# Patient Record
Sex: FEMALE | Race: WHITE | Employment: PART TIME | URBAN - METROPOLITAN AREA
[De-identification: names, ages, dates, MRNs, and addresses within clinical notes are randomized per-mention and may not be internally consistent; named-entity substitution may affect disease eponyms.]

---

## 2017-08-25 ENCOUNTER — OFFICE VISIT (OUTPATIENT)
Dept: FAMILY MEDICINE CLINIC | Age: 27
End: 2017-08-25
Payer: COMMERCIAL

## 2017-08-25 VITALS
BODY MASS INDEX: 38.77 KG/M2 | HEART RATE: 88 BPM | HEIGHT: 67 IN | SYSTOLIC BLOOD PRESSURE: 126 MMHG | TEMPERATURE: 98.1 F | DIASTOLIC BLOOD PRESSURE: 86 MMHG | WEIGHT: 247 LBS

## 2017-08-25 DIAGNOSIS — R07.9 CHEST PAIN, UNSPECIFIED TYPE: Primary | ICD-10-CM

## 2017-08-25 DIAGNOSIS — R00.2 PALPITATIONS: ICD-10-CM

## 2017-08-25 PROCEDURE — 93000 ELECTROCARDIOGRAM COMPLETE: CPT | Performed by: FAMILY MEDICINE

## 2017-08-25 PROCEDURE — 99213 OFFICE O/P EST LOW 20 MIN: CPT | Performed by: FAMILY MEDICINE

## 2017-08-25 RX ORDER — DIPHENHYDRAMINE HCL 50 MG
50 CAPSULE ORAL NIGHTLY PRN
COMMUNITY
End: 2021-03-31 | Stop reason: ALTCHOICE

## 2017-08-25 ASSESSMENT — ENCOUNTER SYMPTOMS
SORE THROAT: 0
RHINORRHEA: 0
BACK PAIN: 0
COUGH: 0
NAUSEA: 0
SHORTNESS OF BREATH: 0
CHEST TIGHTNESS: 0
VOMITING: 0
ABDOMINAL PAIN: 0
EYES NEGATIVE: 1
DIARRHEA: 0

## 2017-08-26 LAB
ABSOLUTE BASO #: 0.1 K/UL (ref 0–0.1)
ABSOLUTE EOS #: 0.2 K/UL (ref 0.1–0.4)
ABSOLUTE LYMPH #: 2.4 K/UL (ref 0.8–5.2)
ABSOLUTE MONO #: 0.6 K/UL (ref 0.1–0.9)
ABSOLUTE NEUT #: 6.1 K/UL (ref 1.3–9.1)
ALBUMIN SERPL-MCNC: 4.1 G/DL (ref 3.2–5.3)
ALK PHOSPHATASE: 73 IU/L (ref 35–121)
ALT SERPL-CCNC: 23 IU/L (ref 5–59)
ANION GAP SERPL CALCULATED.3IONS-SCNC: 6 MMOL/L
AST SERPL-CCNC: 17 IU/L (ref 10–42)
BASOPHILS RELATIVE PERCENT: 0.5 %
BILIRUB SERPL-MCNC: 0.3 MG/DL (ref 0.2–1.3)
BUN BLDV-MCNC: 6 MG/DL (ref 10–20)
CALCIUM SERPL-MCNC: 9.4 MG/DL (ref 8.7–10.8)
CHLORIDE BLD-SCNC: 107 MMOL/L (ref 95–111)
CO2: 27 MMOL/L (ref 21–32)
CREAT SERPL-MCNC: 0.5 MG/DL (ref 0.5–1.3)
EGFR AFRICAN AMERICAN: 178
EGFR IF NONAFRICAN AMERICAN: 147
EOSINOPHILS RELATIVE PERCENT: 2.2 %
GLUCOSE: 101 MG/DL (ref 70–100)
HCT VFR BLD CALC: 41.3 % (ref 36–48)
HEMOGLOBIN: 13.9 G/DL (ref 12–16)
LYMPHOCYTE %: 25.6 %
MAGNESIUM: 1.9 MG/DL (ref 1.7–2.4)
MCH RBC QN AUTO: 28.6 PG (ref 27–34)
MCHC RBC AUTO-ENTMCNC: 33.7 G/DL (ref 31–36)
MCV RBC AUTO: 85 FL (ref 80–100)
MONOCYTES # BLD: 6.4 %
NEUTROPHILS RELATIVE PERCENT: 65 %
PDW BLD-RTO: 12 % (ref 10.8–14.8)
PLATELETS: 323 K/UL (ref 150–450)
POTASSIUM SERPL-SCNC: 4.2 MMOL/L (ref 3.5–5.4)
RBC: 4.86 M/UL (ref 4–5.5)
SODIUM BLD-SCNC: 136 MMOL/L (ref 134–147)
T4 FREE: 1.12 NG/DL (ref 0.8–1.8)
TOTAL PROTEIN: 6.7 G/DL (ref 5.8–8)
TSH SERPL DL<=0.05 MIU/L-ACNC: 0.86 UIU/ML (ref 0.4–4.4)
WBC: 9.3 K/UL (ref 3.7–10.8)

## 2017-08-29 ENCOUNTER — TELEPHONE (OUTPATIENT)
Dept: FAMILY MEDICINE CLINIC | Age: 27
End: 2017-08-29

## 2018-06-19 ENCOUNTER — HOSPITAL ENCOUNTER (EMERGENCY)
Age: 28
Discharge: HOME | End: 2018-06-19
Payer: COMMERCIAL

## 2018-06-19 VITALS
TEMPERATURE: 97.8 F | BODY MASS INDEX: 39.24 KG/M2 | SYSTOLIC BLOOD PRESSURE: 129 MMHG | WEIGHT: 250 LBS | HEART RATE: 83 BPM | RESPIRATION RATE: 16 BRPM | DIASTOLIC BLOOD PRESSURE: 64 MMHG | HEIGHT: 67 IN | OXYGEN SATURATION: 97 %

## 2018-06-19 DIAGNOSIS — N93.9 VAGINAL BLEEDING: ICD-10-CM

## 2018-06-19 DIAGNOSIS — O20.0 THREATENED MISCARRIAGE IN EARLY PREGNANCY: Primary | ICD-10-CM

## 2018-06-19 LAB
BASOPHILS # BLD: 0.4 %
BASOPHILS ABSOLUTE: 0 THOU/MM3 (ref 0–0.1)
EOSINOPHIL # BLD: 1 %
EOSINOPHILS ABSOLUTE: 0.1 THOU/MM3 (ref 0–0.4)
HCG,BETA SUBUNIT,QUAL,SERUM: ABNORMAL MIU/ML (ref 0–5)
HCT VFR BLD CALC: 39.4 % (ref 37–47)
HEMOGLOBIN: 13 GM/DL (ref 12–16)
LYMPHOCYTES # BLD: 21.3 %
LYMPHOCYTES ABSOLUTE: 2.3 THOU/MM3 (ref 1–4.8)
MCH RBC QN AUTO: 28.3 PG (ref 27–31)
MCHC RBC AUTO-ENTMCNC: 33 GM/DL (ref 33–37)
MCV RBC AUTO: 86 FL (ref 81–99)
MONOCYTES # BLD: 7.2 %
MONOCYTES ABSOLUTE: 0.8 THOU/MM3 (ref 0.4–1.3)
NUCLEATED RED BLOOD CELLS: 0 /100 WBC
PDW BLD-RTO: 12.9 % (ref 11.5–14.5)
PLATELET # BLD: 297 THOU/MM3 (ref 130–400)
PMV BLD AUTO: 10.9 FL (ref 7.4–10.4)
RBC # BLD: 4.6 MILL/MM3 (ref 4.2–5.4)
SEG NEUTROPHILS: 70.1 %
SEGMENTED NEUTROPHILS ABSOLUTE COUNT: 7.4 THOU/MM3 (ref 1.8–7.7)
WBC # BLD: 10.6 THOU/MM3 (ref 4.8–10.8)

## 2018-06-19 PROCEDURE — 99213 OFFICE O/P EST LOW 20 MIN: CPT | Performed by: NURSE PRACTITIONER

## 2018-06-19 PROCEDURE — 85025 COMPLETE CBC W/AUTO DIFF WBC: CPT

## 2018-06-19 PROCEDURE — 36415 COLL VENOUS BLD VENIPUNCTURE: CPT

## 2018-06-19 PROCEDURE — 99204 OFFICE O/P NEW MOD 45 MIN: CPT

## 2018-06-19 PROCEDURE — 6360000002 HC RX W HCPCS: Performed by: NURSE PRACTITIONER

## 2018-06-19 PROCEDURE — 84702 CHORIONIC GONADOTROPIN TEST: CPT

## 2018-06-19 RX ORDER — ONDANSETRON 4 MG/1
4 TABLET, ORALLY DISINTEGRATING ORAL EVERY 8 HOURS PRN
Qty: 20 TABLET | Refills: 0 | Status: SHIPPED | OUTPATIENT
Start: 2018-06-19 | End: 2021-03-31

## 2018-06-19 RX ORDER — ONDANSETRON 4 MG/1
4 TABLET, ORALLY DISINTEGRATING ORAL ONCE
Status: COMPLETED | OUTPATIENT
Start: 2018-06-19 | End: 2018-06-19

## 2018-06-19 RX ADMIN — ONDANSETRON 4 MG: 4 TABLET, ORALLY DISINTEGRATING ORAL at 08:31

## 2018-06-19 ASSESSMENT — ENCOUNTER SYMPTOMS
VOMITING: 0
DIARRHEA: 0
SHORTNESS OF BREATH: 0
ABDOMINAL PAIN: 1
NAUSEA: 1

## 2018-06-19 ASSESSMENT — PAIN DESCRIPTION - DESCRIPTORS: DESCRIPTORS: CRAMPING

## 2018-06-19 ASSESSMENT — PAIN DESCRIPTION - LOCATION: LOCATION: ABDOMEN

## 2018-06-19 ASSESSMENT — PAIN DESCRIPTION - PAIN TYPE: TYPE: ACUTE PAIN

## 2018-11-03 ENCOUNTER — HOSPITAL ENCOUNTER (OUTPATIENT)
Age: 28
Discharge: HOME | End: 2018-11-03
Payer: COMMERCIAL

## 2018-11-03 LAB
GLUCOSE FASTING: 92 MG/DL (ref 69–105)
GLUCOSE TOLERANCE TEST 1 HOUR: 163 MG/DL (ref 120–170)
GLUCOSE TOLERANCE TEST 2 HOUR: 124 MG/DL (ref 70–120)
GLUCOSE TOLERANCE TEST 3 HOUR: 100 MG/DL (ref 70–120)

## 2018-11-03 PROCEDURE — 36415 COLL VENOUS BLD VENIPUNCTURE: CPT

## 2018-11-03 PROCEDURE — 82951 GLUCOSE TOLERANCE TEST (GTT): CPT

## 2021-03-26 ENCOUNTER — HOSPITAL ENCOUNTER (OUTPATIENT)
Dept: ULTRASOUND IMAGING | Age: 31
Discharge: HOME | End: 2021-03-26
Payer: MEDICARE

## 2021-03-26 ENCOUNTER — HOSPITAL ENCOUNTER (OUTPATIENT)
Dept: NUCLEAR MEDICINE | Age: 31
Discharge: HOME | End: 2021-03-26

## 2021-03-26 DIAGNOSIS — Z00.6 EXAMINATION FOR NORMAL COMPARISON FOR CLINICAL RESEARCH: ICD-10-CM

## 2021-03-31 ENCOUNTER — OFFICE VISIT (OUTPATIENT)
Dept: SURGERY | Age: 31
End: 2021-03-31
Payer: MEDICARE

## 2021-03-31 VITALS
DIASTOLIC BLOOD PRESSURE: 62 MMHG | HEIGHT: 64 IN | BODY MASS INDEX: 46.78 KG/M2 | OXYGEN SATURATION: 98 % | SYSTOLIC BLOOD PRESSURE: 110 MMHG | RESPIRATION RATE: 18 BRPM | WEIGHT: 274 LBS | HEART RATE: 62 BPM | TEMPERATURE: 96.8 F

## 2021-03-31 DIAGNOSIS — Z01.818 PRE-OP TESTING: ICD-10-CM

## 2021-03-31 DIAGNOSIS — K82.8 BILIARY DYSKINESIA: ICD-10-CM

## 2021-03-31 DIAGNOSIS — K80.20 CALCULUS OF GALLBLADDER WITHOUT CHOLECYSTITIS WITHOUT OBSTRUCTION: Primary | ICD-10-CM

## 2021-03-31 DIAGNOSIS — E66.01 MORBID OBESITY (HCC): ICD-10-CM

## 2021-03-31 PROCEDURE — G8484 FLU IMMUNIZE NO ADMIN: HCPCS | Performed by: SURGERY

## 2021-03-31 PROCEDURE — 1036F TOBACCO NON-USER: CPT | Performed by: SURGERY

## 2021-03-31 PROCEDURE — 99204 OFFICE O/P NEW MOD 45 MIN: CPT | Performed by: SURGERY

## 2021-03-31 PROCEDURE — G8427 DOCREV CUR MEDS BY ELIG CLIN: HCPCS | Performed by: SURGERY

## 2021-03-31 PROCEDURE — G8417 CALC BMI ABV UP PARAM F/U: HCPCS | Performed by: SURGERY

## 2021-03-31 RX ORDER — PANTOPRAZOLE SODIUM 40 MG/1
TABLET, DELAYED RELEASE ORAL
COMMUNITY
Start: 2021-03-11 | End: 2021-11-29 | Stop reason: ALTCHOICE

## 2021-03-31 RX ORDER — FAMOTIDINE 40 MG/1
TABLET, FILM COATED ORAL
COMMUNITY
Start: 2021-03-04 | End: 2021-11-29 | Stop reason: ALTCHOICE

## 2021-03-31 NOTE — LETTER
[address] [facility]  [facility]. [address]  [facility] [address]  Phone: [phone]  Fax: [phone]    Mulu Falk, DIANNA*    April 1, 2021     Patient: Emmie Infante   MR Number: [id number]   YOB: 1990   Date of Visit: 3/31/2021     Dear Hermelindo Askew:    Thank you for the request for consultation on Vivi Richards. Below are the relevant portions of my assessment and plan of care. ASSESSMENT:  1. Cholelithiasis  2. Biliary dyskinesia  3. Morbid obesity (BMI 47)  4. ESPINAL    PLAN:  1. Schedule Hellen for robotic possible open cholecystectomy. 2. She will undergo pre-operative clearance per anesthesia guidelines with risk factors listed under the past medical history diagnosis & problem list.  3. The risks, benefits and alternatives were discussed with [facility] including non-operative management. The pros and cons of robotic, laparoscopic and open techniques were discussed. All questions answered. She understands and wishes to proceed with surgical intervention. 4. Restrictions discussed with [facility] and she expresses understanding. 5. She is advised to call back directly if there are further questions/concerns, or if her symptoms worsen prior to surgery. 6.  Dietary modification/restrictions discussed with patient regards to gallbladder disease. Also nutritional education occurred in regards to obesity. Encouraged weight loss to the best of the patient's ability. All questions answered. 7.  Following up with GI as directed. If you have questions, please do not hesitate to call me. I look forward to following [facility] along with you.     Sincerely,    Lori Mckeon MD

## 2021-04-01 ASSESSMENT — ENCOUNTER SYMPTOMS
SINUS PRESSURE: 0
CHEST TIGHTNESS: 0
COUGH: 0
VOICE CHANGE: 0
TROUBLE SWALLOWING: 0
ALLERGIC/IMMUNOLOGIC NEGATIVE: 1
NAUSEA: 1
BACK PAIN: 0
RECTAL PAIN: 0
CHOKING: 0
ABDOMINAL PAIN: 1
APNEA: 0
EYE DISCHARGE: 0
RHINORRHEA: 0
PHOTOPHOBIA: 0
COLOR CHANGE: 0
VOMITING: 1
ANAL BLEEDING: 0
CONSTIPATION: 0
EYE REDNESS: 0
FACIAL SWELLING: 0
DIARRHEA: 0
ABDOMINAL DISTENTION: 0
WHEEZING: 0
EYE PAIN: 0
STRIDOR: 0
EYE ITCHING: 0
SHORTNESS OF BREATH: 0
SORE THROAT: 0
BLOOD IN STOOL: 0

## 2021-04-01 NOTE — PROGRESS NOTES
Natoedwina Rondon (:  1990)     ASSESSMENT:  1. Cholelithiasis  2. Biliary dyskinesia  3. Morbid obesity (BMI 47)  4. ESPINAL    PLAN:  1. Schedule Hellen for robotic possible open cholecystectomy. 2. She will undergo pre-operative clearance per anesthesia guidelines with risk factors listed under the past medical history diagnosis & problem list.  3. The risks, benefits and alternatives were discussed with Mae Carlin including non-operative management. The pros and cons of robotic, laparoscopic and open techniques were discussed. All questions answered. She understands and wishes to proceed with surgical intervention. 4. Restrictions discussed with Mae Carlin and she expresses understanding. 5. She is advised to call back directly if there are further questions/concerns, or if her symptoms worsen prior to surgery. 6.  Dietary modification/restrictions discussed with patient regards to gallbladder disease. Also nutritional education occurred in regards to obesity. Encouraged weight loss to the best of the patient's ability. All questions answered. 7.  Following up with GI as directed. SUBJECTIVE/OBJECTIVE:    Chief Complaint   Patient presents with    Surgical Consult     New patient-referred by SUELLEN [facility] on HIDA      HPI  Mae Carlin is a 49-year-old female presents for gallbladder disease evaluation. Patient admits symptoms have been going on now for about a year. Gradually becoming more severe and frequent. Usually early in the morning. Moderate to severe epigastric abdominal pain. May last 10-15 minutes but extend up to 2-3 hours. Sharp with some radiation to the right upper quadrant/flank. Associated with nausea. Occasional emesis. No change with bowel function. No hematochezia or melena. No new urinary complaints. Previous ultrasound demonstrated multiple gallstones but no acute disease. Previous HIDA scan had ejection fraction of 34%.   Positive reproduction of Psychiatric/Behavioral: Negative for agitation, behavioral problems, confusion, decreased concentration, dysphoric mood, hallucinations, self-injury, sleep disturbance and suicidal ideas. The patient is not nervous/anxious and is not hyperactive. Past Medical History:   Diagnosis Date    GERD (gastroesophageal reflux disease)     Seasonal allergies        Past Surgical History:   Procedure Laterality Date     SECTION  10/15/2010     SECTION  2019    TUBAL LIGATION  2019    UPPER GASTROINTESTINAL ENDOSCOPY  2020    Nate       Current Outpatient Medications   Medication Sig Dispense Refill    pantoprazole (PROTONIX) 40 MG tablet TAKE 1 TABLET BY MOUTH IN THE MORNING      famotidine (PEPCID) 40 MG tablet TAKE 1 TABLET BY MOUTH EVERY DAY AT BEDTIME AS NEEDED       No current facility-administered medications for this visit.         No Known Allergies    Family History   Problem Relation Age of Onset    Heart Disease Father     Stroke Father     High Blood Pressure Father     High Cholesterol Father     Kidney Disease Father     Other Father         blood disorder MTHFR    No Known Problems Mother     No Known Problems Brother     No Known Problems Maternal Grandmother     No Known Problems Maternal Grandfather     No Known Problems Paternal Grandmother     No Known Problems Brother        Social History     Socioeconomic History    Marital status: Single     Spouse name: Not on file    Number of children: Not on file    Years of education: Not on file    Highest education level: Not on file   Occupational History    Not on file   Social Needs    Financial resource strain: Not on file    Food insecurity     Worry: Not on file     Inability: Not on file    Transportation needs     Medical: Not on file     Non-medical: Not on file   Tobacco Use    Smoking status: Former Smoker     Packs/day: 0.25     Types: Cigarettes     Quit date: 2011 Years since quittin.5    Smokeless tobacco: Never Used    Tobacco comment: 1 pack every 2 days for 3 yrs   Substance and Sexual Activity    Alcohol use: No    Drug use: No    Sexual activity: Not on file   Lifestyle    Physical activity     Days per week: Not on file     Minutes per session: Not on file    Stress: Not on file   Relationships    Social connections     Talks on phone: Not on file     Gets together: Not on file     Attends Taoist service: Not on file     Active member of club or organization: Not on file     Attends meetings of clubs or organizations: Not on file     Relationship status: Not on file    Intimate partner violence     Fear of current or ex partner: Not on file     Emotionally abused: Not on file     Physically abused: Not on file     Forced sexual activity: Not on file   Other Topics Concern    Not on file   Social History Narrative    Not on file     Vitals:    21 0937   BP: 110/62   Site: Right Upper Arm   Position: Sitting   Cuff Size: Medium Adult   Pulse: 62   Resp: 18   Temp: 96.8 °F (36 °C)   TempSrc: Temporal   SpO2: 98%   Weight: 274 lb (124.3 kg)   Height: 5' 4\" (1.626 m)     Body mass index is 47.03 kg/m². Wt Readings from Last 3 Encounters:   21 274 lb (124.3 kg)   18 250 lb (113.4 kg)   17 247 lb (112 kg)     Physical Exam  Vitals signs reviewed. Constitutional:       General: She is not in acute distress. Appearance: She is well-developed. She is not diaphoretic. HENT:      Head: Normocephalic and atraumatic. Right Ear: External ear normal.      Left Ear: External ear normal.      Nose: Nose normal.   Eyes:      General: No scleral icterus. Right eye: No discharge. Left eye: No discharge. Conjunctiva/sclera: Conjunctivae normal.   Neck:      Musculoskeletal: Normal range of motion and neck supple. Cardiovascular:      Rate and Rhythm: Normal rate and regular rhythm.       Heart sounds: Normal heart sounds. Pulmonary:      Effort: Pulmonary effort is normal. No respiratory distress. Breath sounds: Normal breath sounds. No wheezing or rales. Chest:      Chest wall: No tenderness. Abdominal:      General: Bowel sounds are normal. There is no distension. Palpations: Abdomen is soft. There is no mass. Tenderness: There is no abdominal tenderness. There is no guarding or rebound. Musculoskeletal: Normal range of motion. General: No tenderness. Skin:     General: Skin is warm and dry. Coloration: Skin is not pale. Findings: No erythema or rash. Neurological:      Mental Status: She is alert and oriented to person, place, and time. Cranial Nerves: No cranial nerve deficit. Psychiatric:         Behavior: Behavior normal.         Thought Content: Thought content normal.         Judgment: Judgment normal.       Lab Results   Component Value Date     2017    K 4.2 2017     2017    CO2 27 2017     Lab Results   Component Value Date    CREATININE 0.5 2017     Lab Results   Component Value Date    WBC 8.0 08/10/2020    HGB 13.3 08/10/2020    HCT 40.2 08/10/2020    MCV 84.5 08/10/2020     08/10/2020     Lab Results   Component Value Date    ALT 27 08/10/2020    AST 15 08/10/2020    ALKPHOS 71 08/10/2020    BILITOT 0.4 08/10/2020     No results found for: LIPASE    Imaging -   Narrative   Ordering Provider: Pako HYDE   \[facility]\"" [address]          Radiology Department  Patient: [address] [facility] [address]  :  1990   Sex:  F     [address] [facility], [address] [place] [address]     [phone]   Unit #:   [id number]       [facility]       Ordering Phys: Pako Lyons MD, JACKIE            Exam Date: 08/10/20     Accession #:  [id number]     Exam:  US   US Portal Vein     Result: See Report            STUDY:  HEPATIC DOPPLER ULTRASOUND          REASON FOR EXAM:   Female, 30years old. Right Upper Quadrant     Result: See Report            STUDY:  ABDOMINAL ULTRASOUND - RIGHT UPPER QUADRANT          REASON FOR VISIT:   Female, 30years old [address], sharp pain,     nausea and vomiting          TECHNIQUE:   Ultrasound evaluation of the right upper quadrant was     performed with real-time and static gray-scale imaging.          TECHNICAL  QUALITY:   Adequate.          COMPARISON:   None.     ___________________________________          FINDINGS:          Liver:  The liver measures 18 cm.  There is increased echogenicity of the     liver.  The bile ducts are within normal limits.  There is hepatic color     flow.  The direction of portal flow is hepatopetal.  There is no     demonstrated mass lesion.          Gallbladder:  Normal distended gallbladder.  The gallbladder wall measures     2.3 mm.  There is a negative sonographic Pagan''s sign.  There is no     pericholecystic fluid.  There are multiple echogenic structures within the     gallbladder, consistent with multiple gallstones.          Common Bile Duct (C.B.D.):   The common bile duct measures 3.4 mm.          Pancreas:     There is normal echogenicity of the visualized pancreas.     There is no demonstrated pancreatic mass or cyst.          Right Kidney:  Normal size of the right kidney.  The right kidney measures     5.6 x 6.0 x 12.5 cm.  Normal renal cortex.  The right cortex measures 1.0     cm.  There is no demonstrated renal mass or cyst.  There is no right     hydronephrosis.     ___________________________________          IMPRESSION:     1.  Cholelithiasis without sonographic evidence of acute cholecystitis.     2.  Hepatomegaly.  Increased echogenicity of the liver is nonspecific but     most commonly associated with hepatic steatosis.           Electronically Signed:     Jose Dubon MD     2020/08/10 at 10:55 EDT     Tel [phone], Service support  [phone], Fax [phone]                         cc: Johnny Rodriguez ([facility]) CNP; Pako Lyons MD CNSP               Dictated by: Jonna Arreguin. Duran Dubon MD on 08/10/20 1055     Technologist:   RT(R) BUDDY RVT Dori Campbell     Transcribed by: Ozzie Ricardo on 08/10/20 1055          Report Signed by: SHERWIN Tucker MD on 08/10/20 1055             Patient Active Problem List   Diagnosis    Seasonal allergies     An electronic signature was used to authenticate this note.     --Graciela French MD

## 2021-04-30 ENCOUNTER — HOSPITAL ENCOUNTER (EMERGENCY)
Age: 31
Discharge: HOME | End: 2021-04-30
Payer: MEDICARE

## 2021-04-30 VITALS
HEIGHT: 69 IN | BODY MASS INDEX: 39.99 KG/M2 | RESPIRATION RATE: 19 BRPM | SYSTOLIC BLOOD PRESSURE: 150 MMHG | DIASTOLIC BLOOD PRESSURE: 84 MMHG | HEART RATE: 84 BPM | OXYGEN SATURATION: 98 % | TEMPERATURE: 98 F | WEIGHT: 270 LBS

## 2021-04-30 DIAGNOSIS — H18.892 CORNEAL IRRITATION OF LEFT EYE: Primary | ICD-10-CM

## 2021-04-30 PROCEDURE — 99213 OFFICE O/P EST LOW 20 MIN: CPT | Performed by: NURSE PRACTITIONER

## 2021-04-30 PROCEDURE — 99213 OFFICE O/P EST LOW 20 MIN: CPT

## 2021-04-30 RX ORDER — SOFT LENS RINSE,STORE SOLUTION
SOLUTION, NON-ORAL MISCELLANEOUS
Status: DISCONTINUED
Start: 2021-04-30 | End: 2021-04-30 | Stop reason: HOSPADM

## 2021-04-30 RX ORDER — MOXIFLOXACIN 5 MG/ML
1 SOLUTION/ DROPS OPHTHALMIC 3 TIMES DAILY
Qty: 1 BOTTLE | Refills: 0 | Status: SHIPPED | OUTPATIENT
Start: 2021-04-30 | End: 2021-05-07

## 2021-04-30 RX ORDER — PROPARACAINE HYDROCHLORIDE 5 MG/ML
2 SOLUTION/ DROPS OPHTHALMIC ONCE
Status: DISCONTINUED | OUTPATIENT
Start: 2021-04-30 | End: 2021-04-30 | Stop reason: HOSPADM

## 2021-04-30 RX ORDER — PROPARACAINE HYDROCHLORIDE 5 MG/ML
SOLUTION/ DROPS OPHTHALMIC
Status: DISCONTINUED
Start: 2021-04-30 | End: 2021-04-30 | Stop reason: HOSPADM

## 2021-04-30 ASSESSMENT — ENCOUNTER SYMPTOMS: EYE PAIN: 1

## 2021-04-30 NOTE — ED NOTES
Patient discharge instructions given to pt and pt verbalized understanding, no other needs at this time, and pt left in stable condition.      Braden Lee RN  04/30/21 [id number]

## 2021-04-30 NOTE — ED TRIAGE NOTES
Pt presents to  with c/o left eye pain that has been ongoing for two weeks. Pt reports she tried a new mascara and believes a fiber got in her eye. Pt denies changes in vision - reports she sees fine just has pain. Pt has some redness to left eye upon assessment.

## 2021-04-30 NOTE — ED PROVIDER NOTES
BEDTIME AS NEEDEDHistorical Med             ALLERGIES     Patient is has No Known Allergies. Patients   Immunization History   Administered Date(s) Administered    DTaP 05/16/1989, 05/02/1990, 06/03/1990, 07/17/1991, 08/18/1993    Hepatitis B 09/30/2005, 11/04/2005, 05/19/2006    Hib, unspecified 07/17/1991    Influenza Virus Vaccine 09/19/2013    MMR 01/03/1990, 02/22/2001    PPD Test 07/01/2013, 07/10/2013, 06/24/2014    Polio IPV (IPOL) 05/16/1989, 06/03/1990, 07/17/1991, 08/18/1993    Tdap (Boostrix, Adacel) 07/01/2013       FAMILY HISTORY     Patient's family history includes Heart Disease in her father; High Blood Pressure in her father; High Cholesterol in her father; Kidney Disease in her father; No Known Problems in her brother, brother, maternal grandfather, maternal grandmother, mother, and paternal grandmother; Other in her father; Stroke in her father. SOCIAL HISTORY     Patient  reports that she quit smoking about 9 years ago. Her smoking use included cigarettes. She smoked 0.25 packs per day. She has never used smokeless tobacco. She reports that she does not drink alcohol or use drugs. PHYSICAL EXAM     ED TRIAGE VITALS  BP: (!) 150/84, Temp: 98 °F (36.7 °C), Pulse: 84, Resp: 19, SpO2: 98 %,Estimated body mass index is 39.87 kg/m² as calculated from the following:    Height as of this encounter: 5' 9\" (1.753 m). Weight as of this encounter: 270 lb (122.5 kg). ,No LMP recorded. Physical Exam  Vitals signs and nursing note reviewed. Constitutional:       General: She is not in acute distress. Appearance: Normal appearance. She is well-developed. She is not ill-appearing, toxic-appearing or diaphoretic. HENT:      Head: Normocephalic. Right Ear: External ear normal.      Left Ear: External ear normal.      Nose: Nose normal.   Eyes:      General: No allergic shiner or visual field deficit. Right eye: No discharge. Left eye: No foreign body or discharge. Extraocular Movements: Extraocular movements intact. Conjunctiva/sclera:      Left eye: Left conjunctiva is not injected. Pupils: Pupils are equal, round, and reactive to light. Comments: Patient did get relief of discomfort with the Alcaine drops to the left eye. Fluorescein stain was applied the patient did appear to have more like a fiber type of straining noted floating over the eye. The patient stated to her that is what it felt like prior to Alcaine drops. There was no foreign bodies embedded into the eye. Neck:      Musculoskeletal: Full passive range of motion without pain, normal range of motion and neck supple. Cardiovascular:      Rate and Rhythm: Normal rate. Pulmonary:      Effort: Pulmonary effort is normal.   Skin:     General: Skin is warm and dry. Capillary Refill: Capillary refill takes less than 2 seconds. Neurological:      Mental Status: She is alert and oriented to person, place, and time. Psychiatric:         Behavior: Behavior is cooperative. DIAGNOSTIC RESULTS     Labs:No results found for this visit on 04/30/21. IMAGING:    No orders to display         EKG:      URGENT CARE COURSE:     Vitals:    04/30/21 1101 04/30/21 1103   BP:  (!) 150/84   Pulse: 84    Resp: 19    Temp: 98 °F (36.7 °C)    SpO2: 98%    Weight: 270 lb (122.5 kg)    Height: 5' 9\" (1.753 m)        Medications   fluorescein ophthalmic strip 1 mg (has no administration in time range)   proparacaine (ALCAINE) 0.5 % ophthalmic solution 2 drop (has no administration in time range)   proparacaine (ALCAINE) 0.5 % ophthalmic solution (has no administration in time range)   eye wash ophthalmic solution (has no administration in time range)            PROCEDURES:  None    FINAL IMPRESSION      1.  Corneal irritation of left eye          DISPOSITION/ PLAN     Use medication as directed  Don't rub the eye  Do not wear contact lens ×1 week  Monitor for any changes such as drainage, pain,

## 2021-05-07 ENCOUNTER — HOSPITAL ENCOUNTER (OUTPATIENT)
Age: 31
Discharge: HOME | End: 2021-05-07
Payer: MEDICARE

## 2021-05-07 DIAGNOSIS — K80.20 CALCULUS OF GALLBLADDER WITHOUT CHOLECYSTITIS WITHOUT OBSTRUCTION: ICD-10-CM

## 2021-05-07 DIAGNOSIS — Z01.818 PRE-OP TESTING: ICD-10-CM

## 2021-05-07 DIAGNOSIS — K82.8 BILIARY DYSKINESIA: ICD-10-CM

## 2021-05-07 LAB
ALBUMIN SERPL-MCNC: 4.2 G/DL (ref 3.5–5.1)
ALP BLD-CCNC: 90 U/L (ref 38–126)
ALT SERPL-CCNC: 63 U/L (ref 11–66)
AST SERPL-CCNC: 31 U/L (ref 5–40)
BILIRUB SERPL-MCNC: 0.2 MG/DL (ref 0.3–1.2)
BILIRUBIN DIRECT: < 0.2 MG/DL (ref 0–0.3)
TOTAL PROTEIN: 7.3 G/DL (ref 6.1–8)

## 2021-05-07 PROCEDURE — U0005 INFEC AGEN DETEC AMPLI PROBE: HCPCS

## 2021-05-07 PROCEDURE — U0003 INFECTIOUS AGENT DETECTION BY NUCLEIC ACID (DNA OR RNA); SEVERE ACUTE RESPIRATORY SYNDROME CORONAVIRUS 2 (SARS-COV-2) (CORONAVIRUS DISEASE [COVID-19]), AMPLIFIED PROBE TECHNIQUE, MAKING USE OF HIGH THROUGHPUT TECHNOLOGIES AS DESCRIBED BY CMS-2020-01-R: HCPCS

## 2021-05-07 PROCEDURE — 80076 HEPATIC FUNCTION PANEL: CPT

## 2021-05-07 PROCEDURE — 36415 COLL VENOUS BLD VENIPUNCTURE: CPT

## 2021-05-09 LAB
SARS-COV-2: NOT DETECTED
SOURCE: NORMAL

## 2021-05-09 NOTE — H&P
Ceasar Solano (: 1990)   ASSESSMENT:   1. Cholelithiasis   2. Biliary dyskinesia   3. Morbid obesity (BMI 47)   4. ESPINAL   PLAN:   1. Schedule Hellen for robotic possible open cholecystectomy. 2. She will undergo pre-operative clearance per anesthesia guidelines with risk factors listed under the past medical history diagnosis & problem list.   3. The risks, benefits and alternatives were discussed with Atif Gunderson including non-operative management. The pros and cons of robotic, laparoscopic and open techniques were discussed. All questions answered. She understands and wishes to proceed with surgical intervention. 4. Restrictions discussed with Atif Gunderson and she expresses understanding. 5. She is advised to call back directly if there are further questions/concerns, or if her symptoms worsen prior to surgery. 6. Dietary modification/restrictions discussed with patient regards to gallbladder disease. Also nutritional education occurred in regards to obesity. Encouraged weight loss to the best of the patient's ability. All questions answered. 7. Following up with GI as directed. SUBJECTIVE/OBJECTIVE:        Chief Complaint   Patient presents with    Surgical Consult     New patient-referred by SUELLEN Do [facility]-Low EF on HIDA      HPI   Atif Gunderson is a 64-year-old female presents for gallbladder disease evaluation. Patient admits symptoms have been going on now for about a year. Gradually becoming more severe and frequent. Usually early in the morning. Moderate to severe epigastric abdominal pain. May last 10-15 minutes but extend up to 2-3 hours. Sharp with some radiation to the right upper quadrant/flank. Associated with nausea. Occasional emesis. No change with bowel function. No hematochezia or melena. No new urinary complaints. Previous ultrasound demonstrated multiple gallstones but no acute disease. Previous HIDA scan had ejection fraction of 34%. Positive reproduction of symptoms.  History of recent upper endoscopy demonstrating no acute findings. Patient with known fatty liver disease. Denies current chest pain or shortness of breath. No lightheadedness or dizziness. No fever, chills or sweats. Feels symptoms are becoming more severe and frequent and wishing to proceed with cholecystectomy if possible. Review of Systems   Constitutional: Negative for activity change, appetite change, chills, diaphoresis, fatigue, fever and unexpected weight change. HENT: Negative for congestion, dental problem, drooling, ear discharge, ear pain, facial swelling, hearing loss, mouth sores, nosebleeds, postnasal drip, rhinorrhea, sinus pressure, sneezing, sore throat, tinnitus, trouble swallowing and voice change. Eyes: Negative for photophobia, pain, discharge, redness, itching and visual disturbance. Respiratory: Negative for apnea, cough, choking, chest tightness, shortness of breath, wheezing and stridor. Cardiovascular: Negative for chest pain, palpitations and leg swelling. Gastrointestinal: Positive for abdominal pain, nausea and vomiting. Negative for abdominal distention, anal bleeding, blood in stool, constipation, diarrhea and rectal pain. Endocrine: Negative. Genitourinary: Negative for decreased urine volume, difficulty urinating, dyspareunia, dysuria, enuresis, flank pain, frequency, genital sores, hematuria, menstrual problem, pelvic pain, urgency, vaginal bleeding, vaginal discharge and vaginal pain. Musculoskeletal: Negative for arthralgias, back pain, gait problem, joint swelling, myalgias, neck pain and neck stiffness. Skin: Negative for color change, pallor, rash and wound. Allergic/Immunologic: Negative. Neurological: Negative for dizziness, tremors, seizures, syncope, facial asymmetry, speech difficulty, weakness, light-headedness, numbness and headaches. Hematological: Negative for adenopathy. Does not bruise/bleed easily.    Psychiatric/Behavioral: Negative for agitation, behavioral problems, confusion, decreased concentration, dysphoric mood, hallucinations, self-injury, sleep disturbance and suicidal ideas. The patient is not nervous/anxious and is not hyperactive. Past Medical History        Past Medical History:   Diagnosis Date    GERD (gastroesophageal reflux disease)     Seasonal allergies      Past Surgical History         Past Surgical History:   Procedure Laterality Date     SECTION  10/15/2010     SECTION  2019    TUBAL LIGATION  2019    UPPER GASTROINTESTINAL ENDOSCOPY  2020    Nate     Current Facility-Administered Medications          Current Outpatient Medications   Medication Sig Dispense Refill    pantoprazole (PROTONIX) 40 MG tablet TAKE 1 TABLET BY MOUTH IN THE MORNING      famotidine (PEPCID) 40 MG tablet TAKE 1 TABLET BY MOUTH EVERY DAY AT BEDTIME AS NEEDED     No current facility-administered medications for this visit.      No Known Allergies   Family History         Family History   Problem Relation Age of Onset    Heart Disease Father     Stroke Father     High Blood Pressure Father     High Cholesterol Father     Kidney Disease Father     Other Father     blood disorder MTHFR    No Known Problems Mother     No Known Problems Brother     No Known Problems Maternal Grandmother     No Known Problems Maternal Grandfather     No Known Problems Paternal Grandmother     No Known Problems Brother      Social History         Socioeconomic History    Marital status: Single     Spouse name: Not on file    Number of children: Not on file    Years of education: Not on file    Highest education level: Not on file   Occupational History    Not on file   Social Needs    Financial resource strain: Not on file    Food insecurity     Worry: Not on file     Inability: Not on file    Transportation needs     Medical: Not on file     Non-medical: Not on file   Tobacco Use    Smoking status: Former Smoker Packs/day: 0.25     Types: Cigarettes     Quit date: 2011     Years since quittin.5    Smokeless tobacco: Never Used    Tobacco comment: 1 pack every 2 days for 3 yrs   Substance and Sexual Activity    Alcohol use: No    Drug use: No    Sexual activity: Not on file   Lifestyle    Physical activity     Days per week: Not on file     Minutes per session: Not on file    Stress: Not on file   Relationships    Social connections     Talks on phone: Not on file     Gets together: Not on file     Attends Sabianist service: Not on file     Active member of club or organization: Not on file     Attends meetings of clubs or organizations: Not on file     Relationship status: Not on file    Intimate partner violence     Fear of current or ex partner: Not on file     Emotionally abused: Not on file     Physically abused: Not on file     Forced sexual activity: Not on file   Other Topics Concern    Not on file   Social History Narrative    Not on file     Vitals       Vitals:    21 0937   BP: 110/62   Site: Right Upper Arm   Position: Sitting   Cuff Size: Medium Adult   Pulse: 62   Resp: 18   Temp: 96.8 °F (36 °C)   TempSrc: Temporal   SpO2: 98%   Weight: 274 lb (124.3 kg)   Height: 5' 4\" (1.626 m)   Body mass index is 47.03 kg/m². Wt Readings from Last 3 Encounters:   21 274 lb (124.3 kg)   18 250 lb (113.4 kg)   17 247 lb (112 kg)     Physical Exam   Vitals signs reviewed. Constitutional:   General: She is not in acute distress. Appearance: She is well-developed. She is not diaphoretic. HENT:   Head: Normocephalic and atraumatic. Right Ear: External ear normal.   Left Ear: External ear normal.   Nose: Nose normal.   Eyes:   General: No scleral icterus. Right eye: No discharge. Left eye: No discharge. Conjunctiva/sclera: Conjunctivae normal.   Neck:   Musculoskeletal: Normal range of motion and neck supple.    Cardiovascular:   Rate and Rhythm: Normal rate and regular rhythm. Heart sounds: Normal heart sounds. Pulmonary:   Effort: Pulmonary effort is normal. No respiratory distress. Breath sounds: Normal breath sounds. No wheezing or rales. Chest:   Chest wall: No tenderness. Abdominal:   General: Bowel sounds are normal. There is no distension. Palpations: Abdomen is soft. There is no mass. Tenderness: There is no abdominal tenderness. There is no guarding or rebound. Musculoskeletal: Normal range of motion. General: No tenderness. Skin:   General: Skin is warm and dry. Coloration: Skin is not pale. Findings: No erythema or rash. Neurological:   Mental Status: She is alert and oriented to person, place, and time. Cranial Nerves: No cranial nerve deficit. Psychiatric:   Behavior: Behavior normal.   Thought Content: Thought content normal.   Judgment: Judgment normal.           Lab Results   Component Value Date     2017    K 4.2 2017     2017    CO2 27 2017           Lab Results   Component Value Date    CREATININE 0.5 2017           Lab Results   Component Value Date    WBC 8.0 08/10/2020    HGB 13.3 08/10/2020    HCT 40.2 08/10/2020    MCV 84.5 08/10/2020     08/10/2020           Lab Results   Component Value Date    ALT 27 08/10/2020    AST 15 08/10/2020    ALKPHOS 71 08/10/2020    BILITOT 0.4 08/10/2020     No results found for: LIPASE   Imaging -   Narrative   Ordering Provider: [address] [place] [address]       Radiology Department Patient: Arlen Hamm [facility]. : 1990 Sex: 46[address] Location: [place] [id number] Unit #: [id number]    Acct #: [de-identified]    Ordering Phys: Javed Mak MD, CNSP       Exam Date: 08/10/20    Accession #: [id number]    Exam: US US Portal Vein    Result: See Report       STUDY: HEPATIC DOPPLER ULTRASOUND       REASON FOR EXAM: Female, 30years old.  EPIGASTRIC (sharp) PAIN, nausea    and vomiting TECHNIQUE: Technique: Grayscale, color flow and spectral analysis of the    hepatic vessels (portal vein, hepatic veins and hepatic artery) performed. COMPARISON: None    ___________________________________    FINDINGS:       The portal vein is patent with normal direction of flow (hepatopedal). No    evidence of thrombus, collateralization or varices. ? The portal vein    velocity measures? 27 cm/s. The portal vein measures? 1. 0? cm in quiet    respiration, with the patient supine. The hepatic artery shows normal spectral waveform analysis with normal    direction of flow. The peak systolic velocity measures? 68.7 cm/s. The right, middle and left hepatic veins are patent with hepatofugal flow    (normal). There is typical respiratory and atrial variation of the    spectral waveform. ___________________________________       IMPRESSION:       Normal hepatic Doppler ultrasound. No evidence of portal hypertension or    thrombosis. Electronically Signed:    Tracee Field MD    2020/08/10 at 10:54 EDT    Tel [phone], Service support [phone], Fax [phone]                cc: Ruddy Richey ([facility]) CNP; Edu Storm MD CNS          Dictated by: Ghazal Field MD on 08/10/20 1054    Technologist: RT(R) ARDMS T Dori Scott    Transcribed by: Hal Crigler on 08/10/20 1054       Report Signed by: SHERWIN Orr MD on 08/10/20 1054      Narrative   Ordering Provider: [address] [place] [address]       Radiology Department Patient: Ash Navas    [place]. : 1990 Sex:  [place], [address] [facility] [place], [place] Location: [id number] Unit #: [id number]    Acct #: [de-identified]    Ordering Phys: Edu Storm MD, CNSP       Exam Date: 08/10/20    Accession #: [id number]    Exam: US US Abd Right Upper Quadrant    Result: See Report       STUDY: ABDOMINAL ULTRASOUND - RIGHT UPPER QUADRANT       REASON FOR VISIT: Female, 30years old EPIGASTRIC PAIN, sharp pain,    nausea and vomiting       TECHNIQUE: Ultrasound evaluation of the right upper quadrant was    performed with real-time and static gray-scale imaging. TECHNICAL QUALITY: Adequate. COMPARISON: None.    ___________________________________       FINDINGS:       Liver: The liver measures 18 cm. There is increased echogenicity of the    liver. The bile ducts are within normal limits. There is hepatic color    flow. The direction of portal flow is hepatopetal. There is no    demonstrated mass lesion. Gallbladder: Normal distended gallbladder. The gallbladder wall measures    2.3 mm. There is a negative sonographic Pagan''s sign. There is no    pericholecystic fluid. There are multiple echogenic structures within the    gallbladder, consistent with multiple gallstones. Common Bile Duct (C.B.D.): The common bile duct measures 3.4 mm. Pancreas: There is normal echogenicity of the visualized pancreas. There is no demonstrated pancreatic mass or cyst.       Right Kidney: Normal size of the right kidney. The right kidney measures    5.6 x 6.0 x 12.5 cm. Normal renal cortex. The right cortex measures 1.0    cm. There is no demonstrated renal mass or cyst. There is no right    hydronephrosis. ___________________________________       IMPRESSION:    1. Cholelithiasis without sonographic evidence of acute cholecystitis. 2. Hepatomegaly. Increased echogenicity of the liver is nonspecific but    most commonly associated with hepatic steatosis. Electronically Signed:    Nya Garcia MD    2020/08/10 at 10:55 EDT    Tel [phone], Service support [phone], Fax [phone]                cc: Princess Carter ([facility]) CNP; Mildred Oakes MD CNSP          Dictated by: Connie Garcia MD on 08/10/20 1055    Technologist: RT BUDDY Villalpando (R)    Transcribed by: Landrum Brigido on 08/10/20 1055       Report Signed by: Sun Garcia [facility] on 08/10/20 1055 Patient Active Problem List   Diagnosis    Seasonal allergies     An electronic signature was used to authenticate this note.    --Paloma Washington MD

## 2021-05-10 ENCOUNTER — PREP FOR PROCEDURE (OUTPATIENT)
Dept: SURGERY | Age: 31
End: 2021-05-10

## 2021-05-10 RX ORDER — INDOCYANINE GREEN AND WATER 25 MG
5 KIT INJECTION ONCE
Status: CANCELLED | OUTPATIENT
Start: 2021-05-10 | End: 2021-05-10

## 2021-05-10 RX ORDER — SODIUM CHLORIDE 9 MG/ML
INJECTION, SOLUTION INTRAVENOUS CONTINUOUS
Status: CANCELLED | OUTPATIENT
Start: 2021-05-10

## 2021-05-11 ENCOUNTER — TELEPHONE (OUTPATIENT)
Dept: SURGERY | Age: 31
End: 2021-05-11

## 2021-05-11 DIAGNOSIS — K80.20 CALCULUS OF GALLBLADDER WITHOUT CHOLECYSTITIS WITHOUT OBSTRUCTION: Primary | ICD-10-CM

## 2021-05-11 DIAGNOSIS — Z01.818 PRE-OP TESTING: ICD-10-CM

## 2021-05-11 NOTE — TELEPHONE ENCOUNTER
Pt called in, scheduled for robot lap ethan with Dr. Haven More 5/13, started 2-3 days ago with cough, wheezing, congestion, lethargic. Says her ribs and stomach her from coughing so much. She thinks it may be bronchitis and wondering if she should proceed with surgery 5/13. States her COVID came back negative. Discussed with patient that with her cough, wheezing and congestion we should reschedule surgery, she should be evaluated by PCP. Pt in agreement. Surgery r/s'd to 6/3, arrive at 10:30 am.  Pt denies having COVID vaccine, asked that she get re-tested for COVID 5-7 days prior to surgery.  She voiced understanding

## 2021-05-26 ENCOUNTER — HOSPITAL ENCOUNTER (OUTPATIENT)
Age: 31
Discharge: HOME | End: 2021-05-26
Payer: MEDICARE

## 2021-05-26 DIAGNOSIS — K80.20 CALCULUS OF GALLBLADDER WITHOUT CHOLECYSTITIS WITHOUT OBSTRUCTION: ICD-10-CM

## 2021-05-26 DIAGNOSIS — Z01.818 PRE-OP TESTING: ICD-10-CM

## 2021-05-26 PROCEDURE — U0005 INFEC AGEN DETEC AMPLI PROBE: HCPCS

## 2021-05-26 PROCEDURE — U0003 INFECTIOUS AGENT DETECTION BY NUCLEIC ACID (DNA OR RNA); SEVERE ACUTE RESPIRATORY SYNDROME CORONAVIRUS 2 (SARS-COV-2) (CORONAVIRUS DISEASE [COVID-19]), AMPLIFIED PROBE TECHNIQUE, MAKING USE OF HIGH THROUGHPUT TECHNOLOGIES AS DESCRIBED BY CMS-2020-01-R: HCPCS

## 2021-05-27 ENCOUNTER — PREP FOR PROCEDURE (OUTPATIENT)
Dept: SURGERY | Age: 31
End: 2021-05-27

## 2021-05-27 RX ORDER — INDOCYANINE GREEN AND WATER 25 MG
2.5 KIT INJECTION ONCE
Status: CANCELLED | OUTPATIENT
Start: 2021-05-27 | End: 2021-05-27

## 2021-05-27 RX ORDER — SODIUM CHLORIDE 9 MG/ML
INJECTION, SOLUTION INTRAVENOUS CONTINUOUS
Status: CANCELLED | OUTPATIENT
Start: 2021-05-27

## 2021-05-28 LAB
SARS-COV-2: NOT DETECTED
SOURCE: NORMAL

## 2021-05-29 NOTE — H&P
Patricia Carlisle (:  1990)      ASSESSMENT:  1. Cholelithiasis  2. Biliary dyskinesia  3. Morbid obesity (BMI 47)  4. ESPINAL     PLAN:  1. Schedule Hellen for robotic possible open cholecystectomy. 2. She will undergo pre-operative clearance per anesthesia guidelines with risk factors listed under the past medical history diagnosis & problem list.  3. The risks, benefits and alternatives were discussed with [facility] including non-operative management. The pros and cons of robotic, laparoscopic and open techniques were discussed. All questions answered. She understands and wishes to proceed with surgical intervention. 4. Restrictions discussed with [facility] and she expresses understanding. 5. She is advised to call back directly if there are further questions/concerns, or if her symptoms worsen prior to surgery. 6.  Dietary modification/restrictions discussed with patient regards to gallbladder disease. Also nutritional education occurred in regards to obesity. Encouraged weight loss to the best of the patient's ability. All questions answered. 7.  Following up with GI as directed.     SUBJECTIVE/OBJECTIVE:          Chief Complaint   Patient presents with    Surgical Consult       New patient-referred by SUELLEN Do [facility]  on HIDA       HPI  RAAD is a 47-year-old female presents for gallbladder disease evaluation. Patient admits symptoms have been going on now for about a year. Gradually becoming more severe and frequent. Usually early in the morning. Moderate to severe epigastric abdominal pain. May last 10-15 minutes but extend up to 2-3 hours. Sharp with some radiation to the right upper quadrant/flank. Associated with nausea. Occasional emesis. No change with bowel function. No hematochezia or melena. No new urinary complaints. Previous ultrasound demonstrated multiple gallstones but no acute disease. Previous HIDA scan had ejection fraction of 34%.   Positive reproduction of symptoms. History of recent upper endoscopy demonstrating no acute findings. Patient with known fatty liver disease. Denies current chest pain or shortness of breath. No lightheadedness or dizziness. No fever, chills or sweats. Feels symptoms are becoming more severe and frequent and wishing to proceed with cholecystectomy if possible.     Review of Systems   Constitutional: Negative for activity change, appetite change, chills, diaphoresis, fatigue, fever and unexpected weight change. HENT: Negative for congestion, dental problem, drooling, ear discharge, ear pain, facial swelling, hearing loss, mouth sores, nosebleeds, postnasal drip, rhinorrhea, sinus pressure, sneezing, sore throat, tinnitus, trouble swallowing and voice change. Eyes: Negative for photophobia, pain, discharge, redness, itching and visual disturbance. Respiratory: Negative for apnea, cough, choking, chest tightness, shortness of breath, wheezing and stridor. Cardiovascular: Negative for chest pain, palpitations and leg swelling. Gastrointestinal: Positive for abdominal pain, nausea and vomiting. Negative for abdominal distention, anal bleeding, blood in stool, constipation, diarrhea and rectal pain. Endocrine: Negative. Genitourinary: Negative for decreased urine volume, difficulty urinating, dyspareunia, dysuria, enuresis, flank pain, frequency, genital sores, hematuria, menstrual problem, pelvic pain, urgency, vaginal bleeding, vaginal discharge and vaginal pain. Musculoskeletal: Negative for arthralgias, back pain, gait problem, joint swelling, myalgias, neck pain and neck stiffness. Skin: Negative for color change, pallor, rash and wound. Allergic/Immunologic: Negative. Neurological: Negative for dizziness, tremors, seizures, syncope, facial asymmetry, speech difficulty, weakness, light-headedness, numbness and headaches. Hematological: Negative for adenopathy. Does not bruise/bleed easily.  Transportation needs       Medical: Not on file       Non-medical: Not on file   Tobacco Use    Smoking status: Former Smoker       Packs/day: 0.25       Types: Cigarettes       Quit date: 2011       Years since quittin.5    Smokeless tobacco: Never Used    Tobacco comment: 1 pack every 2 days for 3 yrs   Substance and Sexual Activity    Alcohol use: No    Drug use: No    Sexual activity: Not on file   Lifestyle    Physical activity       Days per week: Not on file       Minutes per session: Not on file    Stress: Not on file   Relationships    Social connections       Talks on phone: Not on file       Gets together: Not on file       Attends Cheondo[employer] service: Not on file       Active member of club or organization: Not on file       Attends meetings of clubs or organizations: Not on file       Relationship status: Not on file    Intimate partner violence       Fear of current or ex partner: Not on file       Emotionally abused: Not on file       Physically abused: Not on file       Forced sexual activity: Not on file   Other Topics Concern    Not on file   Social History Narrative    Not on file         Vitals       Vitals:     21 0937   BP: 110/62   Site: Right Upper Arm   Position: Sitting   Cuff Size: Medium Adult   Pulse: 62   Resp: 18   Temp: 96.8 °F (36 °C)   TempSrc: Temporal   SpO2: 98%   Weight: 274 lb (124.3 kg)   Height: 5' 4\" (1.626 m)         Body mass index is 47.03 kg/m².         Wt Readings from Last 3 Encounters:   21 274 lb (124.3 kg)   18 250 lb (113.4 kg)   17 247 lb (112 kg)      Physical Exam  Vitals signs reviewed. Constitutional:       General: She is not in acute distress. Appearance: She is well-developed. She is not diaphoretic. HENT:      Head: Normocephalic and atraumatic. Right Ear: External ear normal.      Left Ear: External ear normal.      Nose: Nose normal.   Eyes:      General: No scleral icterus.         Right eye: No discharge. Left eye: No discharge. Conjunctiva/sclera: Conjunctivae normal.   Neck:      Musculoskeletal: Normal range of motion and neck supple. Cardiovascular:      Rate and Rhythm: Normal rate and regular rhythm. Heart sounds: Normal heart sounds. Pulmonary:      Effort: Pulmonary effort is normal. No respiratory distress. Breath sounds: Normal breath sounds. No wheezing or rales. Chest:      Chest wall: No tenderness. Abdominal:      General: Bowel sounds are normal. There is no distension. Palpations: Abdomen is soft. There is no mass. Tenderness: There is no abdominal tenderness. There is no guarding or rebound. Musculoskeletal: Normal range of motion. General: No tenderness. Skin:     General: Skin is warm and dry. Coloration: Skin is not pale. Findings: No erythema or rash. Neurological:      Mental Status: She is alert and oriented to person, place, and time. Cranial Nerves: No cranial nerve deficit. Psychiatric:         Behavior: Behavior normal.         Thought Content: Thought content normal.         Judgment: Judgment normal.               Lab Results   Component Value Date      2017     K 4.2 2017      2017     CO2 27 2017            Lab Results   Component Value Date     CREATININE 0.5 2017            Lab Results   Component Value Date     WBC 8.0 08/10/2020     HGB 13.3 08/10/2020     HCT 40.2 08/10/2020     MCV 84.5 08/10/2020      08/10/2020            Lab Results   Component Value Date     ALT 27 08/10/2020     AST 15 08/10/2020     ALKPHOS 71 08/10/2020     BILITOT 0.4 08/10/2020      No results found for: LIPASE     Imaging -   Narrative   Ordering Provider: Shila Mcneil          Radiology Department  Patient: [address]   :  1990   Sex: 67 [place] Leonor Briand   Larena Pean, [address] [facility]     [phone]   Unit #:   [id number]       [facility]       Ordering Phys: Danitza Bateman MD, CNSP            Exam Date: 08/10/20     Accession #:  [id number]     Exam:  US   US Portal Vein     Result: See Report            STUDY:  HEPATIC DOPPLER ULTRASOUND          REASON FOR EXAM:   Female, 30years old.  EPIGASTRIC (sharp) PAIN, nausea     and vomiting     TECHNIQUE: Technique: Grayscale, color flow and spectral analysis of the     hepatic vessels (portal vein, hepatic veins and hepatic artery) performed.          COMPARISON: None     ___________________________________     FINDINGS:          The portal vein is patent with normal direction of flow (hepatopedal). No     evidence of thrombus, collateralization or varices. ? The portal vein     velocity measures? 27 cm/s. The portal vein measures? 1. 0? cm in quiet     respiration, with the patient supine.          The hepatic artery shows normal spectral waveform analysis with normal     direction of flow. The peak systolic velocity measures? 68.7 cm/s.          The right, middle and left hepatic veins are patent with hepatofugal flow     (normal). There is typical respiratory and atrial variation of the     spectral waveform.          ___________________________________          IMPRESSION:          Normal hepatic Doppler ultrasound.  No evidence of portal hypertension or     thrombosis.          Electronically Signed:     Abdoul Odonnell MD     2020/08/10 at 10:54 EDT     Tel [phone], Service support  [phone], Fax [phone]                         cc: Lilliam Anthony ([facility]) CNP; Danitza Bateman MD CNSP               Dictated by: Martin Duff.  Carlos Odonnell MD on 08/10/20 1054     Technologist:   RT BUDDY Levine (R)     Transcribed by: Pastor Jeffers on 08/10/20 1054          Report Signed by: SHERWIN Penny MD on 08/10/20 1054        Narrative   Ordering Provider: Danitza MEJIAP   [place] [id number]          Radiology Department  Patient: [address] [facility] [address]  :  1990   Sex:  F     [facility], [address] [facility]  Annalisa Gold     [phone]   Unit #:   [id number]       Acct #: [de-identified]       Ordering Phys: Edu Storm MD, CNSP            Exam Date: 08/10/20     Accession #:  [id number]     Exam:  US   US Abd Right Upper Quadrant     Result: See Report            STUDY:  ABDOMINAL ULTRASOUND - RIGHT UPPER QUADRANT          REASON FOR VISIT:   Female, 30years old [address], sharp pain,     nausea and vomiting          TECHNIQUE:   Ultrasound evaluation of the right upper quadrant was     performed with real-time and static gray-scale imaging.          TECHNICAL  QUALITY:   Adequate.          COMPARISON:   None.     ___________________________________          FINDINGS:          Liver:  The liver measures 18 cm.  There is increased echogenicity of the     liver.  The bile ducts are within normal limits.  There is hepatic color     flow.  The direction of portal flow is hepatopetal.  There is no     demonstrated mass lesion.          Gallbladder:  Normal distended gallbladder.  The gallbladder wall measures     2.3 mm.  There is a negative sonographic Pagan''s sign.  There is no     pericholecystic fluid.  There are multiple echogenic structures within the     gallbladder, consistent with multiple gallstones.          Common Bile Duct (C.B.D.):   The common bile duct measures 3.4 mm.          Pancreas:     There is normal echogenicity of the visualized pancreas.     There is no demonstrated pancreatic mass or cyst.          Right Kidney:  Normal size of the right kidney.  The right kidney measures     5.6 x 6.0 x 12.5 cm.  Normal renal cortex.  The right cortex measures 1.0     cm.  There is no demonstrated renal mass or cyst.  There is no right     hydronephrosis.     ___________________________________          IMPRESSION:     1.  Cholelithiasis without sonographic evidence of acute cholecystitis.     2.  Hepatomegaly.  Increased echogenicity of the liver is nonspecific but     most commonly associated with hepatic steatosis.          Electronically Signed:     Nya Garcia MD     2020/08/10 at 10:55 EDT     Tel [phone], Service support  [phone], Fax [phone]                         cc: Princess Carter ([facility]) CNP; Mildred Oakes MD CNSP               Dictated by: Oral Garcia MD on 08/10/20 1055     Technologist:   RT(R) LELIAS RVT Dori Villalpando     Transcribed by: Isamar Oleary on 08/10/20 1055          Report Signed by: SHERWIN Hollins MDs on 08/10/20 1055                  Patient Active Problem List   Diagnosis    Seasonal allergies      An electronic signature was used to authenticate this note.  Inés Sawant MD

## 2021-06-03 ENCOUNTER — HOSPITAL ENCOUNTER (OUTPATIENT)
Age: 31
Setting detail: OUTPATIENT SURGERY
Discharge: HOME | End: 2021-06-03
Attending: SURGERY | Admitting: SURGERY
Payer: MEDICARE

## 2021-06-03 ENCOUNTER — ANESTHESIA (OUTPATIENT)
Dept: OPERATING ROOM | Age: 31
End: 2021-06-03
Payer: MEDICARE

## 2021-06-03 ENCOUNTER — ANESTHESIA EVENT (OUTPATIENT)
Dept: OPERATING ROOM | Age: 31
End: 2021-06-03
Payer: MEDICARE

## 2021-06-03 VITALS
HEIGHT: 68 IN | SYSTOLIC BLOOD PRESSURE: 151 MMHG | OXYGEN SATURATION: 94 % | RESPIRATION RATE: 18 BRPM | HEART RATE: 91 BPM | WEIGHT: 275.5 LBS | DIASTOLIC BLOOD PRESSURE: 76 MMHG | BODY MASS INDEX: 41.75 KG/M2 | TEMPERATURE: 97.1 F

## 2021-06-03 VITALS — TEMPERATURE: 97 F | OXYGEN SATURATION: 100 % | SYSTOLIC BLOOD PRESSURE: 137 MMHG | DIASTOLIC BLOOD PRESSURE: 90 MMHG

## 2021-06-03 DIAGNOSIS — K82.8 BILIARY DYSKINESIA: Primary | ICD-10-CM

## 2021-06-03 PROCEDURE — 88304 TISSUE EXAM BY PATHOLOGIST: CPT

## 2021-06-03 PROCEDURE — 7100000000 HC PACU RECOVERY - FIRST 15 MIN: Performed by: SURGERY

## 2021-06-03 PROCEDURE — 7100000001 HC PACU RECOVERY - ADDTL 15 MIN: Performed by: SURGERY

## 2021-06-03 PROCEDURE — 3700000000 HC ANESTHESIA ATTENDED CARE: Performed by: SURGERY

## 2021-06-03 PROCEDURE — 6360000002 HC RX W HCPCS: Performed by: NURSE ANESTHETIST, CERTIFIED REGISTERED

## 2021-06-03 PROCEDURE — 47562 LAPAROSCOPIC CHOLECYSTECTOMY: CPT | Performed by: SURGERY

## 2021-06-03 PROCEDURE — 6360000002 HC RX W HCPCS: Performed by: SURGERY

## 2021-06-03 PROCEDURE — 6370000000 HC RX 637 (ALT 250 FOR IP): Performed by: ANESTHESIOLOGY

## 2021-06-03 PROCEDURE — 6370000000 HC RX 637 (ALT 250 FOR IP)

## 2021-06-03 PROCEDURE — 3600000012 HC SURGERY LEVEL 2 ADDTL 15MIN: Performed by: SURGERY

## 2021-06-03 PROCEDURE — 7100000010 HC PHASE II RECOVERY - FIRST 15 MIN: Performed by: SURGERY

## 2021-06-03 PROCEDURE — 2500000003 HC RX 250 WO HCPCS: Performed by: NURSE ANESTHETIST, CERTIFIED REGISTERED

## 2021-06-03 PROCEDURE — 2500000003 HC RX 250 WO HCPCS

## 2021-06-03 PROCEDURE — 3700000001 HC ADD 15 MINUTES (ANESTHESIA): Performed by: SURGERY

## 2021-06-03 PROCEDURE — 2580000003 HC RX 258

## 2021-06-03 PROCEDURE — 7100000011 HC PHASE II RECOVERY - ADDTL 15 MIN: Performed by: SURGERY

## 2021-06-03 PROCEDURE — 6360000002 HC RX W HCPCS

## 2021-06-03 PROCEDURE — 2500000003 HC RX 250 WO HCPCS: Performed by: SURGERY

## 2021-06-03 PROCEDURE — 2709999900 HC NON-CHARGEABLE SUPPLY: Performed by: SURGERY

## 2021-06-03 PROCEDURE — 3600000002 HC SURGERY LEVEL 2 BASE: Performed by: SURGERY

## 2021-06-03 PROCEDURE — 2580000003 HC RX 258: Performed by: SURGERY

## 2021-06-03 RX ORDER — SUCCINYLCHOLINE CHLORIDE 20 MG/ML
INJECTION INTRAMUSCULAR; INTRAVENOUS PRN
Status: DISCONTINUED | OUTPATIENT
Start: 2021-06-03 | End: 2021-06-03 | Stop reason: SDUPTHER

## 2021-06-03 RX ORDER — INDOCYANINE GREEN AND WATER 25 MG
2.5 KIT INJECTION ONCE
Status: COMPLETED | OUTPATIENT
Start: 2021-06-03 | End: 2021-06-03

## 2021-06-03 RX ORDER — HYDROCODONE BITARTRATE AND ACETAMINOPHEN 5; 325 MG/1; MG/1
1-2 TABLET ORAL EVERY 6 HOURS PRN
Qty: 20 TABLET | Refills: 0 | Status: SHIPPED | OUTPATIENT
Start: 2021-06-03 | End: 2021-06-06

## 2021-06-03 RX ORDER — KETOROLAC TROMETHAMINE 30 MG/ML
INJECTION, SOLUTION INTRAMUSCULAR; INTRAVENOUS
Status: COMPLETED
Start: 2021-06-03 | End: 2021-06-03

## 2021-06-03 RX ORDER — FENTANYL CITRATE 50 UG/ML
50 INJECTION, SOLUTION INTRAMUSCULAR; INTRAVENOUS EVERY 5 MIN PRN
Status: DISCONTINUED | OUTPATIENT
Start: 2021-06-03 | End: 2021-06-03 | Stop reason: HOSPADM

## 2021-06-03 RX ORDER — OXYCODONE HYDROCHLORIDE 5 MG/1
5 TABLET ORAL EVERY 4 HOURS PRN
Status: DISCONTINUED | OUTPATIENT
Start: 2021-06-03 | End: 2021-06-03 | Stop reason: HOSPADM

## 2021-06-03 RX ORDER — INDOCYANINE GREEN AND WATER 25 MG
5 KIT INJECTION ONCE
Status: DISCONTINUED | OUTPATIENT
Start: 2021-06-03 | End: 2021-06-03 | Stop reason: HOSPADM

## 2021-06-03 RX ORDER — FENTANYL CITRATE 50 UG/ML
INJECTION, SOLUTION INTRAMUSCULAR; INTRAVENOUS PRN
Status: DISCONTINUED | OUTPATIENT
Start: 2021-06-03 | End: 2021-06-03 | Stop reason: SDUPTHER

## 2021-06-03 RX ORDER — BUPIVACAINE HYDROCHLORIDE 5 MG/ML
INJECTION, SOLUTION PERINEURAL PRN
Status: DISCONTINUED | OUTPATIENT
Start: 2021-06-03 | End: 2021-06-03 | Stop reason: ALTCHOICE

## 2021-06-03 RX ORDER — PROPOFOL 10 MG/ML
INJECTION, EMULSION INTRAVENOUS PRN
Status: DISCONTINUED | OUTPATIENT
Start: 2021-06-03 | End: 2021-06-03 | Stop reason: SDUPTHER

## 2021-06-03 RX ORDER — GLYCOPYRROLATE 1 MG/5 ML
SYRINGE (ML) INTRAVENOUS PRN
Status: DISCONTINUED | OUTPATIENT
Start: 2021-06-03 | End: 2021-06-03 | Stop reason: SDUPTHER

## 2021-06-03 RX ORDER — ONDANSETRON 2 MG/ML
4 INJECTION INTRAMUSCULAR; INTRAVENOUS EVERY 6 HOURS PRN
Status: DISCONTINUED | OUTPATIENT
Start: 2021-06-03 | End: 2021-06-03 | Stop reason: HOSPADM

## 2021-06-03 RX ORDER — SODIUM CHLORIDE 0.9 % (FLUSH) 0.9 %
5-40 SYRINGE (ML) INJECTION EVERY 12 HOURS SCHEDULED
Status: DISCONTINUED | OUTPATIENT
Start: 2021-06-03 | End: 2021-06-03 | Stop reason: HOSPADM

## 2021-06-03 RX ORDER — KETOROLAC TROMETHAMINE 30 MG/ML
INJECTION, SOLUTION INTRAMUSCULAR; INTRAVENOUS PRN
Status: DISCONTINUED | OUTPATIENT
Start: 2021-06-03 | End: 2021-06-03 | Stop reason: SDUPTHER

## 2021-06-03 RX ORDER — ONDANSETRON 2 MG/ML
INJECTION INTRAMUSCULAR; INTRAVENOUS
Status: COMPLETED
Start: 2021-06-03 | End: 2021-06-03

## 2021-06-03 RX ORDER — ONDANSETRON 4 MG/1
4 TABLET, ORALLY DISINTEGRATING ORAL EVERY 8 HOURS PRN
Status: DISCONTINUED | OUTPATIENT
Start: 2021-06-03 | End: 2021-06-03 | Stop reason: HOSPADM

## 2021-06-03 RX ORDER — KETOROLAC TROMETHAMINE 30 MG/ML
15 INJECTION, SOLUTION INTRAMUSCULAR; INTRAVENOUS
Status: COMPLETED | OUTPATIENT
Start: 2021-06-03 | End: 2021-06-03

## 2021-06-03 RX ORDER — OXYCODONE HYDROCHLORIDE 5 MG/1
TABLET ORAL
Status: COMPLETED
Start: 2021-06-03 | End: 2021-06-03

## 2021-06-03 RX ORDER — ROCURONIUM BROMIDE 10 MG/ML
INJECTION, SOLUTION INTRAVENOUS PRN
Status: DISCONTINUED | OUTPATIENT
Start: 2021-06-03 | End: 2021-06-03 | Stop reason: SDUPTHER

## 2021-06-03 RX ORDER — MIDAZOLAM HYDROCHLORIDE 1 MG/ML
INJECTION INTRAMUSCULAR; INTRAVENOUS PRN
Status: DISCONTINUED | OUTPATIENT
Start: 2021-06-03 | End: 2021-06-03 | Stop reason: SDUPTHER

## 2021-06-03 RX ORDER — MORPHINE SULFATE 2 MG/ML
4 INJECTION, SOLUTION INTRAMUSCULAR; INTRAVENOUS
Status: DISCONTINUED | OUTPATIENT
Start: 2021-06-03 | End: 2021-06-03 | Stop reason: HOSPADM

## 2021-06-03 RX ORDER — SODIUM CHLORIDE 9 MG/ML
INJECTION, SOLUTION INTRAVENOUS CONTINUOUS
Status: DISCONTINUED | OUTPATIENT
Start: 2021-06-03 | End: 2021-06-03 | Stop reason: HOSPADM

## 2021-06-03 RX ORDER — SODIUM CHLORIDE 9 MG/ML
25 INJECTION, SOLUTION INTRAVENOUS PRN
Status: DISCONTINUED | OUTPATIENT
Start: 2021-06-03 | End: 2021-06-03 | Stop reason: HOSPADM

## 2021-06-03 RX ORDER — ONDANSETRON 2 MG/ML
INJECTION INTRAMUSCULAR; INTRAVENOUS PRN
Status: DISCONTINUED | OUTPATIENT
Start: 2021-06-03 | End: 2021-06-03 | Stop reason: SDUPTHER

## 2021-06-03 RX ORDER — DEXAMETHASONE SODIUM PHOSPHATE 10 MG/ML
INJECTION, EMULSION INTRAMUSCULAR; INTRAVENOUS PRN
Status: DISCONTINUED | OUTPATIENT
Start: 2021-06-03 | End: 2021-06-03 | Stop reason: SDUPTHER

## 2021-06-03 RX ORDER — MORPHINE SULFATE 2 MG/ML
2 INJECTION, SOLUTION INTRAMUSCULAR; INTRAVENOUS
Status: DISCONTINUED | OUTPATIENT
Start: 2021-06-03 | End: 2021-06-03 | Stop reason: HOSPADM

## 2021-06-03 RX ORDER — KETOROLAC TROMETHAMINE 10 MG/1
10 TABLET, FILM COATED ORAL EVERY 8 HOURS PRN
Qty: 15 TABLET | Refills: 0 | Status: SHIPPED | OUTPATIENT
Start: 2021-06-03 | End: 2021-11-29 | Stop reason: ALTCHOICE

## 2021-06-03 RX ORDER — OXYCODONE HYDROCHLORIDE 5 MG/1
10 TABLET ORAL EVERY 4 HOURS PRN
Status: DISCONTINUED | OUTPATIENT
Start: 2021-06-03 | End: 2021-06-03 | Stop reason: HOSPADM

## 2021-06-03 RX ORDER — PROMETHAZINE HYDROCHLORIDE 25 MG/ML
12.5 INJECTION, SOLUTION INTRAMUSCULAR; INTRAVENOUS
Status: DISCONTINUED | OUTPATIENT
Start: 2021-06-03 | End: 2021-06-03 | Stop reason: HOSPADM

## 2021-06-03 RX ORDER — SODIUM CHLORIDE 0.9 % (FLUSH) 0.9 %
5-40 SYRINGE (ML) INJECTION PRN
Status: DISCONTINUED | OUTPATIENT
Start: 2021-06-03 | End: 2021-06-03 | Stop reason: HOSPADM

## 2021-06-03 RX ORDER — LABETALOL 20 MG/4 ML (5 MG/ML) INTRAVENOUS SYRINGE
10 EVERY 10 MIN PRN
Status: DISCONTINUED | OUTPATIENT
Start: 2021-06-03 | End: 2021-06-03 | Stop reason: HOSPADM

## 2021-06-03 RX ORDER — SCOLOPAMINE TRANSDERMAL SYSTEM 1 MG/1
1 PATCH, EXTENDED RELEASE TRANSDERMAL ONCE
Status: DISCONTINUED | OUTPATIENT
Start: 2021-06-03 | End: 2021-06-03 | Stop reason: HOSPADM

## 2021-06-03 RX ORDER — LIDOCAINE HCL/PF 100 MG/5ML
SYRINGE (ML) INJECTION PRN
Status: DISCONTINUED | OUTPATIENT
Start: 2021-06-03 | End: 2021-06-03 | Stop reason: SDUPTHER

## 2021-06-03 RX ADMIN — ONDANSETRON 4 MG: 2 INJECTION INTRAMUSCULAR; INTRAVENOUS at 15:37

## 2021-06-03 RX ADMIN — INDOCYANINE GREEN AND WATER 2.5 MG: KIT at 11:31

## 2021-06-03 RX ADMIN — ONDANSETRON HYDROCHLORIDE 4 MG: 4 INJECTION, SOLUTION INTRAMUSCULAR; INTRAVENOUS at 12:52

## 2021-06-03 RX ADMIN — ROCURONIUM BROMIDE 30 MG: 10 INJECTION INTRAVENOUS at 12:47

## 2021-06-03 RX ADMIN — SUGAMMADEX 200 MG: 100 INJECTION, SOLUTION INTRAVENOUS at 13:36

## 2021-06-03 RX ADMIN — FENTANYL CITRATE 50 MCG: 50 INJECTION, SOLUTION INTRAMUSCULAR; INTRAVENOUS at 13:17

## 2021-06-03 RX ADMIN — CEFOXITIN SODIUM 3000 MG: 1 POWDER, FOR SOLUTION INTRAVENOUS at 12:37

## 2021-06-03 RX ADMIN — OXYCODONE 5 MG: 5 TABLET ORAL at 15:01

## 2021-06-03 RX ADMIN — KETOROLAC TROMETHAMINE 30 MG: 30 INJECTION, SOLUTION INTRAMUSCULAR at 13:35

## 2021-06-03 RX ADMIN — DEXAMETHASONE SODIUM PHOSPHATE 10 MG: 10 INJECTION, EMULSION INTRAMUSCULAR; INTRAVENOUS at 12:52

## 2021-06-03 RX ADMIN — Medication 0.2 MG: at 12:45

## 2021-06-03 RX ADMIN — SODIUM CHLORIDE: 9 INJECTION, SOLUTION INTRAVENOUS at 11:12

## 2021-06-03 RX ADMIN — SUCCINYLCHOLINE CHLORIDE 120 MG: 20 INJECTION, SOLUTION INTRAMUSCULAR; INTRAVENOUS at 12:39

## 2021-06-03 RX ADMIN — FENTANYL CITRATE 100 MCG: 50 INJECTION, SOLUTION INTRAMUSCULAR; INTRAVENOUS at 12:35

## 2021-06-03 RX ADMIN — Medication 100 MG: at 12:39

## 2021-06-03 RX ADMIN — OXYCODONE HYDROCHLORIDE 5 MG: 5 TABLET ORAL at 15:01

## 2021-06-03 RX ADMIN — KETOROLAC TROMETHAMINE 15 MG: 30 INJECTION, SOLUTION INTRAMUSCULAR; INTRAVENOUS at 13:55

## 2021-06-03 RX ADMIN — FENTANYL CITRATE 50 MCG: 50 INJECTION, SOLUTION INTRAMUSCULAR; INTRAVENOUS at 13:05

## 2021-06-03 RX ADMIN — ROCURONIUM BROMIDE 20 MG: 10 INJECTION INTRAVENOUS at 13:00

## 2021-06-03 RX ADMIN — SUGAMMADEX 200 MG: 100 INJECTION, SOLUTION INTRAVENOUS at 13:41

## 2021-06-03 RX ADMIN — PROPOFOL 200 MG: 10 INJECTION, EMULSION INTRAVENOUS at 12:39

## 2021-06-03 RX ADMIN — MIDAZOLAM 2 MG: 1 INJECTION INTRAMUSCULAR; INTRAVENOUS at 12:35

## 2021-06-03 ASSESSMENT — PULMONARY FUNCTION TESTS
PIF_VALUE: 24
PIF_VALUE: 25
PIF_VALUE: 24
PIF_VALUE: 14
PIF_VALUE: 24
PIF_VALUE: 19
PIF_VALUE: 24
PIF_VALUE: 20
PIF_VALUE: 22
PIF_VALUE: 23
PIF_VALUE: 2
PIF_VALUE: 19
PIF_VALUE: 20
PIF_VALUE: 8
PIF_VALUE: 24
PIF_VALUE: 0
PIF_VALUE: 3
PIF_VALUE: 19
PIF_VALUE: 19
PIF_VALUE: 20
PIF_VALUE: 15
PIF_VALUE: 25
PIF_VALUE: 14
PIF_VALUE: 15
PIF_VALUE: 25
PIF_VALUE: 1
PIF_VALUE: 20
PIF_VALUE: 20
PIF_VALUE: 19
PIF_VALUE: 24
PIF_VALUE: 1
PIF_VALUE: 19
PIF_VALUE: 20
PIF_VALUE: 20
PIF_VALUE: 19
PIF_VALUE: 14
PIF_VALUE: 25
PIF_VALUE: 25
PIF_VALUE: 8
PIF_VALUE: 26
PIF_VALUE: 14
PIF_VALUE: 19
PIF_VALUE: 24
PIF_VALUE: 21
PIF_VALUE: 20
PIF_VALUE: 0
PIF_VALUE: 3
PIF_VALUE: 19
PIF_VALUE: 25
PIF_VALUE: 0
PIF_VALUE: 19
PIF_VALUE: 23
PIF_VALUE: 25
PIF_VALUE: 15
PIF_VALUE: 12
PIF_VALUE: 19
PIF_VALUE: 14
PIF_VALUE: 24
PIF_VALUE: 3
PIF_VALUE: 26
PIF_VALUE: 4
PIF_VALUE: 4
PIF_VALUE: 1
PIF_VALUE: 24
PIF_VALUE: 24
PIF_VALUE: 22
PIF_VALUE: 5

## 2021-06-03 ASSESSMENT — PAIN SCALES - GENERAL
PAINLEVEL_OUTOF10: 2
PAINLEVEL_OUTOF10: 4
PAINLEVEL_OUTOF10: 0
PAINLEVEL_OUTOF10: 3
PAINLEVEL_OUTOF10: 5
PAINLEVEL_OUTOF10: 0
PAINLEVEL_OUTOF10: 5

## 2021-06-03 NOTE — PROGRESS NOTES
Pt has met discharge criteria and states she is ready for discharge to home. IV removed, gauze and tape applied. Dressed in own clothes and personal belongings gathered. Discharge instructions (with opioid medication education information) given to pt and family; pt and family verbalized understanding of discharge instructions, prescriptions x2 and follow up appointments. Pt transported to discharge lobby by [facility] staff.

## 2021-06-03 NOTE — ANESTHESIA PRE PROCEDURE
Department of Anesthesiology  Preprocedure Note       Name:  Jabari Shah   Age:  26 y.o.  :  1990                                          MRN:  [id number]         Date:  6/3/2021      Surgeon: Wilmer Schwartz):  Risa Alcaraz MD    Procedure: Procedure(s):  CHOLECYSTECTOMY LAPAROSCOPIC ROBOTIC, POSS OPEN    Medications prior to admission:   Prior to Admission medications    Medication Sig Start Date End Date Taking?  Authorizing Provider   pantoprazole (PROTONIX) 40 MG tablet TAKE 1 TABLET BY MOUTH IN THE MORNING 3/11/21  Yes Historical Provider, MD   famotidine (PEPCID) 40 MG tablet TAKE 1 TABLET BY MOUTH EVERY DAY AT BEDTIME AS NEEDED 3/4/21  Yes Historical Provider, MD       Current medications:    Current Facility-Administered Medications   Medication Dose Route Frequency Provider Last Rate Last Admin    cefOXitin (MEFOXIN) 3,000 mg in dextrose 5 % 50 mL IVPB  3,000 mg Intravenous Once Rias Alcaraz MD        0.9 % sodium chloride infusion   Intravenous Continuous Crystal Camper, LPN        0.9 % sodium chloride infusion   Intravenous Continuous Crystal Camper, LPN        indocyanine green (IC-GREEN) syringe 2.5 mg  2.5 mg Intravenous Once Crystal Camper, LPN           Allergies:  No Known Allergies    Problem List:    Patient Active Problem List   Diagnosis Code    Seasonal allergies J30.2       Past Medical History:        Diagnosis Date    GERD (gastroesophageal reflux disease)     PONV (postoperative nausea and vomiting)     Seasonal allergies        Past Surgical History:        Procedure Laterality Date     SECTION  10/15/2010     SECTION  2019    TUBAL LIGATION  2019    UPPER GASTROINTESTINAL ENDOSCOPY  2020    [address] [place] [address]       Social History:    Social History     Tobacco Use    Smoking status: Former Smoker     Packs/day: 0.25     Types: Cigarettes     Quit date: 2011     Years since quittin.7    Smokeless tobacco: Never Used    Tobacco comment: 1 pack every 2 days for 3 yrs   Substance Use Topics    Alcohol use: No                                Counseling given: Not Answered  Comment: 1 pack every 2 days for 3 yrs      Vital Signs (Current):   Vitals:    06/03/21 1046 06/03/21 1057   BP: (!) 154/95    Pulse: 93    Resp: 16    Temp: 97.8 °F (36.6 °C)    TempSrc: Temporal    SpO2: 98%    Weight:  275 lb 8 oz (125 kg)   Height:  5' 8\" (1.727 m)                                              BP Readings from Last 3 Encounters:   06/03/21 (!) 154/95   04/30/21 (!) 150/84   03/31/21 110/62       NPO Status:                                                                                 BMI:   Wt Readings from Last 3 Encounters:   06/03/21 275 lb 8 oz (125 kg)   04/30/21 270 lb (122.5 kg)   03/31/21 274 lb (124.3 kg)     Body mass index is 41.89 kg/m². CBC:   Lab Results   Component Value Date    WBC 8.0 08/10/2020    RBC 4.76 08/10/2020    RBC 4.86 08/25/2017    HGB 13.3 08/10/2020    HCT 40.2 08/10/2020    MCV 84.5 08/10/2020    RDW 12.9 08/10/2020     08/10/2020       CMP:   Lab Results   Component Value Date     08/25/2017    K 4.2 08/25/2017     08/25/2017    CO2 27 08/25/2017    BUN 6 08/25/2017    CREATININE 0.5 08/25/2017    GLUCOSE 101 08/25/2017    PROT 7.3 05/07/2021    CALCIUM 9.4 08/25/2017    BILITOT 0.2 05/07/2021    ALKPHOS 90 05/07/2021    AST 31 05/07/2021    ALT 63 05/07/2021       POC Tests: No results for input(s): POCGLU, POCNA, POCK, POCCL, POCBUN, POCHEMO, POCHCT in the last 72 hours.     Coags:   Lab Results   Component Value Date    PROTIME 12.7 03/30/2016    PROTIME 12.7 03/30/2016       HCG (If Applicable): No results found for: PREGTESTUR, PREGSERUM, HCG, HCGQUANT     ABGs: No results found for: PHART, PO2ART, QDY3MYM, GOQ3EMA, BEART, S6LIUWFB     Type & Screen (If Applicable):  No results found for: LABABO, LABRH    Drug/Infectious Status (If Applicable):  No results found for: HIV, HEPCAB COVID-19 Screening (If Applicable):   Lab Results   Component Value Date    COVID19 Not Detected 05/26/2021           Anesthesia Evaluation  Patient summary reviewed history of anesthetic complications:   Airway: Mallampati: II  TM distance: >3 FB   Neck ROM: full  Mouth opening: > = 3 FB Dental:          Pulmonary:                              Cardiovascular:                      Neuro/Psych:               GI/Hepatic/Renal:   (+) GERD:,           Endo/Other:                     Abdominal:           Vascular:                                        Anesthesia Plan      general     ASA 2       Induction: intravenous and rapid sequence. MIPS: Prophylactic antiemetics administered. Anesthetic plan and risks discussed with patient. Plan discussed with CRNA. Suzan Cano.  [address] [place] [address]   6/3/2021

## 2021-06-03 NOTE — ANESTHESIA POSTPROCEDURE EVALUATION
Department of Anesthesiology  Postprocedure Note    Patient: Jabari Shah  MRN: [id number]  YOB: 1990  Date of evaluation: 6/3/2021  Time:  2:39 PM     Procedure Summary     Date: 06/03/21 Room / Location: [address]    Anesthesia Start: 2086 Anesthesia Stop: 8976    Procedure: CHOLECYSTECTOMY LAPAROSCOPIC ROBOTIC (N/A ) Diagnosis: (CHOLELITHIASIS, BILIARY DYSKINESIA)    Surgeons: Risa Alcaraz MD Responsible Provider: Delena Krabbe, DO    Anesthesia Type: general ASA Status: 2          Anesthesia Type: general    Angelica Phase I: Angelica Score: 9    Angelica Phase II: Angelica Score: 10    Last vitals: Reviewed and per EMR flowsheets.        Anesthesia Post Evaluation    Patient location during evaluation: PACU  Patient participation: complete - patient participated  Level of consciousness: awake  Airway patency: patent  Nausea & Vomiting: no vomiting and no nausea  Cardiovascular status: hemodynamically stable  Respiratory status: acceptable  Hydration status: stable

## 2021-06-03 NOTE — BRIEF OP NOTE
Brief Postoperative Note      Patient: Korina Finn  YOB: 1990  MRN: [id number]    Date of Procedure: 6/3/2021    Pre-Op Diagnosis: CHOLELITHIASIS, BILIARY DYSKINESIA    Post-Op Diagnosis: Same       Procedure(s):  CHOLECYSTECTOMY LAPAROSCOPIC ROBOTIC    Surgeon(s):  Rosa Maria Jimenez MD    Assistant:  First Assistant: Kristy Myers RN    Anesthesia: General/Local    Estimated Blood Loss (mL): 5 ml    Complications: None    Specimens:   ID Type Source Tests Collected by Time Destination   A : gallbladder Tissue Gallbladder SURGICAL PATHOLOGY Rosa Maria Jimenez MD 6/3/2021 1247        Implants:  * No implants in log *      Drains: * No LDAs found *    Findings: as above - see op note for details    Electronically signed by Rosa Maria Jimenez MD on 6/3/2021 at 1:40 PM

## 2021-06-03 NOTE — PROGRESS NOTES
[id number] 21- Pt arrived in the PACU. Pt denied pain at this time  1355- Pt complained of some moderate pain 4/10 medicated per MAR  1405- Pt is resting peacefully no complaint of pain  1419- Pt taken to [facility]. Report called to Leigh Slaughter, all questions answered.

## 2021-06-03 NOTE — PROGRESS NOTES
Pt returned to [facility] room 15. Vitals and assessment as charted. 0.9 infusing, @650ml to count from PACU. Pt has crackers and pop. Pt verbalized understanding of discharge criteria and call light use. Call light in reach.

## 2021-06-04 ENCOUNTER — TELEPHONE (OUTPATIENT)
Dept: SURGERY | Age: 31
End: 2021-06-04

## 2021-06-04 NOTE — OP NOTE
anesthesia was induced. She tolerated this well throughout the case. All pressure points were  padded. She was on preoperative antibiotics. Bilateral lower extremity  sequential compression devices were placed prior to incision. Her  abdomen and pelvis were prepped and draped in the usual sterile standard  fashion. A timeout occurred prior to the operation which not only  identified the patient, but also the planned procedure to be performed. At the end of the timeout, there were no questions or concerns. I began the operation by making a small transverse incision above the  umbilicus. Fascia was elevated and the Veress needle was inserted. Intra-abdominal cavity was insufflated to a pressure of approximately 15  mmHg with carbon dioxide gas. The patient tolerated the insufflation  well. 12-mm trocar placed. Laparoscope inserted. Upon initial  evaluation, there was no hollow viscus, solid organ or major vascular  injury with the Veress needle insertion or the first trocar placement. Two other 8 mm trocars were placed in a standard location under direct  vision. 5 mm was then placed in far right upper lateral quadrant. She  was placed in reverse Trendelenburg with the right side elevated. The  robot was then brought in and docked. Instrument was placed under  direct vision. Once everything was aligned and in order, I then  unscrubbed and went back to the console. I began the operation By first taking off the adhesion of the anterior  wall of the gallbladder which was mostly omentum with electrocautery  using the hook. Fundus was then able to be grasped and elevated up over  the dome of the liver to better expose the infundibulum which was then  grasped and retracted down to the right lower quadrant to better expose  Calot's triangle. The inflammatory tissues overlying the infundibulum  were then incised and eventually the cystic artery and duct were each  identified.   They were each then circumferentially dissected free until  the critical view was obtained. At that time, they were each then  doubly clipped and divided close to the gallbladder. Firefly was used  to ensure duct anatomy. Gallbladder was then taken off of the liver bed  using electrocautery. Hemostasis adequate. Gallbladder was then placed  into an endoscopic retrieval bag. Clips were evaluated and they  appeared to be intact. No oozing of blood or bile from the cystic  artery and cystic duct stumps nor from the liver bed. Instruments were  then removed, robot undocked, and I scrubbed back into the case. EndoCatch bag was removed to the supraumbilical trocar site and sent to  Pathology for permanent. Large trocar site was closed at the fascia  with Vicryl suture on a Storz suture passer. At the completion of this,  there were no fascial defects. Subcutaneous tissues were irrigated. Hemostasis was adequate. Skin was reapproximated at all the incisional  sites with 4-0 Vicryl in a subcuticular fashion. Closed incisions were  then cleaned, dried and Steri-Strips applied. Dry sterile dressings  applied. Sponge, needle, and instrumentation counts were correct at the  end of the procedure. The patient tolerated the procedure well with no  apparent complications and less than 10 mL of blood loss. She was able  to be brought out of general anesthesia and then transferred to the  postanesthesia care unit in stable condition.         Pauline Griggs M.D.    D: 06/03/2021 14:06:30       T: 06/03/2021 16:22:46     [email or web address]  Job#: [id number]     Doc#: [id number]    CC:

## 2021-06-14 ENCOUNTER — OFFICE VISIT (OUTPATIENT)
Dept: SURGERY | Age: 31
End: 2021-06-14

## 2021-06-14 VITALS
OXYGEN SATURATION: 98 % | HEART RATE: 98 BPM | BODY MASS INDEX: 41.37 KG/M2 | TEMPERATURE: 97.5 F | DIASTOLIC BLOOD PRESSURE: 77 MMHG | SYSTOLIC BLOOD PRESSURE: 127 MMHG | WEIGHT: 273 LBS | RESPIRATION RATE: 14 BRPM | HEIGHT: 68 IN

## 2021-06-14 DIAGNOSIS — Z90.49 S/P LAPAROSCOPIC CHOLECYSTECTOMY: ICD-10-CM

## 2021-06-14 PROCEDURE — 99024 POSTOP FOLLOW-UP VISIT: CPT | Performed by: NURSE PRACTITIONER

## 2021-06-14 ASSESSMENT — ENCOUNTER SYMPTOMS
SORE THROAT: 0
WHEEZING: 0
SINUS PRESSURE: 0
ALLERGIC/IMMUNOLOGIC NEGATIVE: 1
PHOTOPHOBIA: 0
VOMITING: 0
EYE ITCHING: 0
APNEA: 0
CHEST TIGHTNESS: 0
SHORTNESS OF BREATH: 0
COLOR CHANGE: 0
NAUSEA: 0
CONSTIPATION: 0
EYE PAIN: 0
EYE DISCHARGE: 0
CHOKING: 0
ABDOMINAL PAIN: 0
ANAL BLEEDING: 0
BACK PAIN: 0
BLOOD IN STOOL: 0
DIARRHEA: 0
COUGH: 0
ABDOMINAL DISTENTION: 0
RHINORRHEA: 0

## 2021-06-14 NOTE — PROGRESS NOTES
[address] [facility]  [address] [facility] [address]  Dept: [phone]  Dept Fax: [phone]  Loc: [phone]    Visit Date: 2021    Santiago Owen is a 26 y.o. female who presents today for:  Chief Complaint   Patient presents with    Post-Op Check     s/p Robotic cholecystectomy 6/3/21       HPI:     HPI    RAAD is a 33-year old female patient who presents today for follow-up status post robotic cholecystectomy 2 weeks ago. Patient is doing well. Off narcotics. Still has some incisional soreness. Tolerating regular diet without any nausea or vomiting. Denies any issues with constipation or diarrhea. Incisions are healing well without any signs of infection. No fevers or chills. Urinating without difficulties. Denies any shortness of breath or chest pain. Tolerating increased activity well. Still fatigued from time to time. No abdominal pain like she had prior to surgery.      Past Medical History:   Diagnosis Date    Biliary dyskinesia     GERD (gastroesophageal reflux disease)     PONV (postoperative nausea and vomiting)     Seasonal allergies       Past Surgical History:   Procedure Laterality Date     SECTION  10/15/2010     SECTION  2019    CHOLECYSTECTOMY, LAPAROSCOPIC N/A 6/3/2021    CHOLECYSTECTOMY LAPAROSCOPIC ROBOTIC performed by Ravinder Lees MD at [facility]  2019    UPPER GASTROINTESTINAL ENDOSCOPY  2020    Jacquie Thomas       Family History   Problem Relation Age of Onset    Heart Disease Father     Stroke Father     High Blood Pressure Father     High Cholesterol Father     Kidney Disease Father     Other Father         blood disorder MTHFR    No Known Problems Mother     No Known Problems Brother     No Known Problems Maternal Grandmother     No Known Problems Maternal Grandfather     No Known Problems Paternal Grandmother     No Known Problems Brother        Social History     Tobacco Use    Smoking status: Former Smoker     Packs/day: 0.25     Types: Cigarettes     Quit date: 2011     Years since quittin.7    Smokeless tobacco: Never Used    Tobacco comment: 1 pack every 2 days for 3 yrs   Substance Use Topics    Alcohol use: No      Current Outpatient Medications   Medication Sig Dispense Refill    pantoprazole (PROTONIX) 40 MG tablet TAKE 1 TABLET BY MOUTH IN THE MORNING      famotidine (PEPCID) 40 MG tablet TAKE 1 TABLET BY MOUTH EVERY DAY AT BEDTIME AS NEEDED      ketorolac (TORADOL) 10 MG tablet Take 1 tablet by mouth every 8 hours as needed for Pain (Patient not taking: Reported on 2021) 15 tablet 0     No current facility-administered medications for this visit. No Known Allergies    Subjective:     Review of Systems   Constitutional: Positive for fatigue. Negative for activity change, appetite change, chills, diaphoresis, fever and unexpected weight change. HENT: Negative for congestion, dental problem, hearing loss, rhinorrhea, sinus pressure and sore throat. Eyes: Negative for photophobia, pain, discharge, itching and visual disturbance. Respiratory: Negative for apnea, cough, choking, chest tightness, shortness of breath and wheezing. Cardiovascular: Negative for chest pain, palpitations and leg swelling. Gastrointestinal: Negative for abdominal distention, abdominal pain, anal bleeding, blood in stool, constipation, diarrhea, nausea and vomiting. Endocrine: Negative. Genitourinary: Negative for decreased urine volume, difficulty urinating, dysuria, frequency and urgency. Musculoskeletal: Negative for arthralgias, back pain, gait problem, joint swelling, myalgias and neck pain. Skin: Negative for color change, pallor, rash and wound. Allergic/Immunologic: Negative. Neurological: Negative for dizziness, tremors, weakness, numbness and headaches. Hematological: Negative. Psychiatric/Behavioral: Negative. Objective:   /77 (Site: Left Upper Arm, Position: Sitting, Cuff Size: Medium Adult)   Pulse 98   Temp 97.5 °F (36.4 °C) (Tympanic)   Resp 14   Ht 5' 8\" (1.727 m)   Wt 273 lb (123.8 kg)   SpO2 98%   BMI 41.51 kg/m²     Physical Exam  Vitals reviewed. Constitutional:       General: She is not in acute distress. Appearance: Normal appearance. She is well-developed. She is not ill-appearing or toxic-appearing. HENT:      Head: Normocephalic and atraumatic. Right Ear: Hearing and external ear normal.      Left Ear: Hearing and external ear normal.      Nose: Nose normal.      Mouth/Throat:      Mouth: Mucous membranes are not pale, not dry and not cyanotic. Eyes:      General: Lids are normal.   Neck:      Trachea: Trachea and phonation normal.   Cardiovascular:      Rate and Rhythm: Normal rate and regular rhythm. Pulses: Normal pulses. Heart sounds: S1 normal and S2 normal.   Pulmonary:      Effort: Pulmonary effort is normal. No tachypnea, bradypnea, accessory muscle usage or respiratory distress. Breath sounds: Normal breath sounds. No decreased breath sounds, wheezing or rales. Chest:      Chest wall: No tenderness. Abdominal:      General: Bowel sounds are normal. There is no distension. Palpations: Abdomen is soft. There is no mass. Tenderness: There is no abdominal tenderness. Musculoskeletal:         General: No tenderness. Normal range of motion. Cervical back: Normal range of motion and neck supple. Skin:     General: Skin is warm and dry. Findings: No abrasion, bruising, burn, ecchymosis, erythema, laceration, lesion or rash. Neurological:      Mental Status: She is alert and oriented to person, place, and time. Motor: No tremor, atrophy or abnormal muscle tone. Coordination: Coordination normal.      Gait: Gait normal.      Deep Tendon Reflexes: Reflexes are normal and symmetric. Psychiatric:         Speech: Speech normal.         Behavior: Behavior normal.         Thought Content: Thought content normal.        PATHOLOGY REPORT     FINAL DIAGNOSIS:   Gallbladder, cholecystectomy:    Mild chronic cholecystitis.    Cholelithiasis.    Cholesterolosis. Specimen:   GALLBLADDER       Gross Examination:   The container is labeled gumaro Carrington.  Received in   formalin is an intact gallbladder measuring 7.5 cm in length x 3.2 cm   in diameter.  The cystic duct is stapled.  The serosal surface is   unremarkable.  The gallbladder is opened to reveal numerous yellow   gallstones measuring up to 0.3 cm in maximum dimension.  Representative   sections including the cystic duct margin are submitted in one   cassette.  1 ss.  SMW:[id number]     Microscopic Examination:   Microscopic examination was performed. Patient Active Problem List   Diagnosis    Seasonal allergies    S/P laparoscopic cholecystectomy     Assessment:     1. Status post robotic cholecystectomy  2. Morbid obesity (BMI 41)    Plan:     1. Incisions are healing well without signs of infection. Continue wound care as directed. 2. Pathology reviewed and discussed with patient. No questions. 3. Appetite doing well. Continue diet as tolerated. 4. Bowel function doing well  5. Wear abdominal binder for comfort. Off and on as needed throughout the day. 6. Off narcotics. Tylenol as needed for discomfort. 7. Lifting/activity restrictions discussed with patient. Questions answered. 8. Follow up as needed. Signs and symptoms reviewed with patient that would be concerning and need her to return to office for re-evaluation. Patient states she will call if she has questions or concerns.       Electronically signed by DIANNA Akins CNP on 6/14/2021 at 8:37 PM

## 2021-09-20 ENCOUNTER — OFFICE VISIT (OUTPATIENT)
Dept: FAMILY MEDICINE CLINIC | Age: 31
End: 2021-09-20
Payer: MEDICARE

## 2021-09-20 VITALS
WEIGHT: 279.2 LBS | HEIGHT: 68 IN | TEMPERATURE: 98.2 F | HEART RATE: 67 BPM | RESPIRATION RATE: 16 BRPM | BODY MASS INDEX: 42.31 KG/M2 | DIASTOLIC BLOOD PRESSURE: 80 MMHG | SYSTOLIC BLOOD PRESSURE: 138 MMHG | OXYGEN SATURATION: 97 %

## 2021-09-20 DIAGNOSIS — Z11.59 ENCOUNTER FOR HEPATITIS C SCREENING TEST FOR LOW RISK PATIENT: ICD-10-CM

## 2021-09-20 DIAGNOSIS — R00.2 HEART PALPITATIONS: ICD-10-CM

## 2021-09-20 DIAGNOSIS — I10 ESSENTIAL HYPERTENSION: Primary | ICD-10-CM

## 2021-09-20 DIAGNOSIS — F41.9 ANXIETY: ICD-10-CM

## 2021-09-20 DIAGNOSIS — Z11.4 SCREENING FOR HIV (HUMAN IMMUNODEFICIENCY VIRUS): ICD-10-CM

## 2021-09-20 PROCEDURE — G8417 CALC BMI ABV UP PARAM F/U: HCPCS | Performed by: NURSE PRACTITIONER

## 2021-09-20 PROCEDURE — G8427 DOCREV CUR MEDS BY ELIG CLIN: HCPCS | Performed by: NURSE PRACTITIONER

## 2021-09-20 PROCEDURE — 1036F TOBACCO NON-USER: CPT | Performed by: NURSE PRACTITIONER

## 2021-09-20 PROCEDURE — 99204 OFFICE O/P NEW MOD 45 MIN: CPT | Performed by: NURSE PRACTITIONER

## 2021-09-20 ASSESSMENT — PATIENT HEALTH QUESTIONNAIRE - PHQ9
SUM OF ALL RESPONSES TO PHQ9 QUESTIONS 1 & 2: 0
SUM OF ALL RESPONSES TO PHQ QUESTIONS 1-9: 0
2. FEELING DOWN, DEPRESSED OR HOPELESS: 0
1. LITTLE INTEREST OR PLEASURE IN DOING THINGS: 0
SUM OF ALL RESPONSES TO PHQ QUESTIONS 1-9: 0
SUM OF ALL RESPONSES TO PHQ QUESTIONS 1-9: 0

## 2021-09-20 ASSESSMENT — ENCOUNTER SYMPTOMS: RESPIRATORY NEGATIVE: 1

## 2021-09-20 NOTE — PROGRESS NOTES
[facility]  [address] [place] [address]  Dept: [phone]  Dept Fax: [phone]  Loc: Yolie Teran is a 26 y.o. femalewho presents today for her medical conditions/complaints as noted below. Raulito Section c/o of New Patient (pap done in 2018 with Gretel Later) and Other (seen at [facility] for elevated BP. went this past thursday. had started lisinopril and it didn't react welll. ventricular contractions is what she was told she is feeling. )      HPI:      Pt here to establish care. Pt was recently place don lisinopril and became hot and had a headache and went to [facility] and had elevated bP had a normal ct of head, normal labs and EKG. Was told she has PVC's and feels heart palpitations. Placed on metoprolol, lisinopril stopped. Feels better with the metoprolol. Will get those records. Pt states her father had elevated BP, also notes she has been told in the past has PVC's never placed on meds for it before. States since being on the metoprolol, feels more calm less anxious, heart palpitations have decreased. Had been on buspar for anxiety in past, takes it off and on does not feel anxious though and not having panic attacks at this time. HTN    Does patient check BP regularly at home? - No  Current Medication regimen - metoprolol 25 mg daily   Tolerating medications well? - yes    Shortness of breath or chest pain? No  Headache or visual complaints? States headache has gone since bp better   Neurologic changes like confusion? No  Extremity edema? No    BP Readings from Last 3 Encounters:  09/20/21 : 138/80  06/14/21 : 127/77  06/03/21 : (!) 151/76    Pt denies any other health conditions.           Current Outpatient Medications   Medication Sig Dispense Refill    metoprolol tartrate (LOPRESSOR) 25 MG tablet Take 1 tablet by mouth daily 90 tablet 1    pantoprazole (PROTONIX) 40 MG tablet TAKE 1 TABLET BY MOUTH IN THE MORNING      ketorolac (TORADOL) 10 MG tablet Take 1 tablet by mouth every 8 hours as needed for Pain (Patient not taking: Reported on 2021) 15 tablet 0    famotidine (PEPCID) 40 MG tablet TAKE 1 TABLET BY MOUTH EVERY DAY AT BEDTIME AS NEEDED       No current facility-administered medications for this visit.           Past Medical History:   Diagnosis Date    Biliary dyskinesia     GERD (gastroesophageal reflux disease)     PONV (postoperative nausea and vomiting)     Seasonal allergies       Past Surgical History:   Procedure Laterality Date     SECTION  10/15/2010     SECTION  2019    CHOLECYSTECTOMY, LAPAROSCOPIC N/A 6/3/2021    CHOLECYSTECTOMY LAPAROSCOPIC ROBOTIC performed by Vicente Flowers MD at [address] [facility] [address]  2019    UPPER GASTROINTESTINAL ENDOSCOPY  2020    Emir Tam     Family History   Problem Relation Age of Onset    Heart Disease Father     Stroke Father     High Blood Pressure Father     High Cholesterol Father     Kidney Disease Father     Other Father         blood disorder MTHFR    No Known Problems Mother     No Known Problems Brother     No Known Problems Maternal Grandmother     No Known Problems Maternal Grandfather     No Known Problems Paternal Grandmother     No Known Problems Brother      Social History     Tobacco Use    Smoking status: Former Smoker     Packs/day: 0.25     Types: Cigarettes     Quit date: 2011     Years since quitting: 10.0    Smokeless tobacco: Never Used    Tobacco comment: 1 pack every 2 days for 3 yrs   Substance Use Topics    Alcohol use: No        No Known Allergies    Health Maintenance   Topic Date Due    Hepatitis C screen  Never done    HIV screen  Never done    Cervical cancer screen  Never done    Flu vaccine (1) 2021    DTaP/Tdap/Td vaccine (8 - Td or Tdap) 2029    Hepatitis B vaccine  Completed    Hib vaccine  Completed    COVID-19 Vaccine  Completed    Hepatitis A vaccine  Aged Out    Meningococcal (ACWY) vaccine  Aged Out    Pneumococcal 0-64 years Vaccine  Aged Out    Varicella vaccine  Discontinued       Subjective:      Review of Systems   Constitutional: Negative for chills, fatigue and fever. HENT: Negative. Respiratory: Negative. Cardiovascular: Positive for palpitations. Negative for chest pain and leg swelling. Genitourinary: Negative for difficulty urinating and dysuria. Musculoskeletal: Negative. Skin: Negative. Neurological: Negative for dizziness, facial asymmetry, weakness, light-headedness and headaches. Psychiatric/Behavioral: Negative for agitation, behavioral problems, confusion, decreased concentration, dysphoric mood, self-injury, sleep disturbance and suicidal ideas. The patient is nervous/anxious. The patient is not hyperactive. Objective:      /80   Pulse 67   Temp 98.2 °F (36.8 °C) (Oral)   Resp 16   Ht 5' 8\" (1.727 m)   Wt 279 lb 3.2 oz (126.6 kg)   SpO2 97%   BMI 42.45 kg/m²      Physical Exam  Vitals and nursing note reviewed. Constitutional:       Appearance: She is not ill-appearing. HENT:      Right Ear: Tympanic membrane, ear canal and external ear normal.      Left Ear: Tympanic membrane, ear canal and external ear normal.      Nose: Nose normal.      Mouth/Throat:      Mouth: Mucous membranes are moist.   Eyes:      Extraocular Movements: Extraocular movements intact. Conjunctiva/sclera: Conjunctivae normal.      Pupils: Pupils are equal, round, and reactive to light. Cardiovascular:      Rate and Rhythm: Normal rate and regular rhythm. Pulses: Normal pulses. Heart sounds: Normal heart sounds. No murmur heard. Pulmonary:      Effort: Pulmonary effort is normal. No respiratory distress. Breath sounds: Normal breath sounds. No wheezing. Abdominal:      General: Abdomen is flat. Bowel sounds are normal. There is no distension. Palpations: Abdomen is soft. Tenderness: There is no abdominal tenderness. Musculoskeletal:         General: Normal range of motion. Skin:     General: Skin is warm and dry. Capillary Refill: Capillary refill takes less than 2 seconds. Neurological:      Mental Status: She is alert. Psychiatric:         Attention and Perception: Attention normal.         Mood and Affect: Mood is anxious. Speech: Speech is rapid and pressured. Behavior: Behavior normal. Behavior is cooperative. Thought Content: Thought content normal. Thought content does not include homicidal or suicidal plan. Cognition and Memory: Cognition normal.         Judgment: Judgment normal.      Comments: Pt did sweat during visit no chest pain           Assessment/Plan:           1. Essential hypertension  Controlled  Continue current meds  Low fat low cholesterol diet  - Lipid Panel; Future  - Holter Monitor 48 Hour; Future  - metoprolol tartrate (LOPRESSOR) 25 MG tablet; Take 1 tablet by mouth daily  Dispense: 90 tablet; Refill: 1    2. Heart palpitations  Rule out PVC's vs anxiety vs thyroid disorder  - Lipid Panel; Future  - TSH With Reflex Ft4; Future  - Holter Monitor 48 Hour; Future  - metoprolol tartrate (LOPRESSOR) 25 MG tablet; Take 1 tablet by mouth daily  Dispense: 90 tablet; Refill: 1    3. Screening for HIV (human immunodeficiency virus)    - HIV-1 and HIV-2 Antibodies; Future    4. Encounter for hepatitis C screening test for low risk patient    - Hepatitis C Antibody; Future    5. Anxiety  Monitor  May take buspar if needed  Will evaluate at next visit  Pt in agreement with plan       Return in about 2 months (around 11/20/2021) for f/u . Reccommended tobaccocessation options including pharmacologic methods, counseled great than 3 minutesduring this visit:  Yes[]  No  []       Patient given educational materials -see patient instructions. Discussed use, benefit, and side effects of prescribedmedications.   All patient questions answered. Pt voiced understanding. Reviewedhealth maintenance. Instructed to continue current medications, diet and exercise. Patient agreed with treatment plan. Follow up as directed.        Electronicallysigned by DIANNA Vazquez CNP on 9/20/2021 at 10:44 AM

## 2021-09-21 ENCOUNTER — TELEPHONE (OUTPATIENT)
Dept: FAMILY MEDICINE CLINIC | Age: 31
End: 2021-09-21

## 2021-09-21 NOTE — TELEPHONE ENCOUNTER
Scheduling 48 hour holter  09/27/21 @ [facility] @ 10:30  Arrive @ 10:15 am  To [address] , 3rd floor   Wear mask ,  Bring id and insurance

## 2021-09-22 ENCOUNTER — HOSPITAL ENCOUNTER (OUTPATIENT)
Age: 31
Setting detail: SPECIMEN
Discharge: HOME | End: 2021-09-22
Payer: MEDICARE

## 2021-09-22 LAB
CHOLESTEROL/HDL RATIO: 5.6
CHOLESTEROL: 197 MG/DL
HDLC SERPL-MCNC: 35 MG/DL
HEPATITIS C ANTIBODY: NONREACTIVE
HIV AG/AB: NONREACTIVE
LDL CHOLESTEROL: 135 MG/DL (ref 0–130)
TRIGL SERPL-MCNC: 137 MG/DL
TSH SERPL DL<=0.05 MIU/L-ACNC: 1.05 MIU/L (ref 0.3–5)
VLDLC SERPL CALC-MCNC: ABNORMAL MG/DL (ref 1–30)

## 2021-09-23 ENCOUNTER — TELEPHONE (OUTPATIENT)
Dept: FAMILY MEDICINE CLINIC | Age: 31
End: 2021-09-23

## 2021-09-23 NOTE — TELEPHONE ENCOUNTER
----- Message from DIANNA Spence CNP sent at 9/23/2021 10:45 AM EDT -----  Let pt know thyroid labs are normal, cholesterol at upper end of normal recommend a physical aerobic activity plan to help with weight loss and  to bring cholesterol down.  HIV and hep c negative

## 2021-09-27 ENCOUNTER — TELEPHONE (OUTPATIENT)
Dept: FAMILY MEDICINE CLINIC | Age: 31
End: 2021-09-27

## 2021-09-27 NOTE — TELEPHONE ENCOUNTER
Patient calling stating that she recently had her holter monitor placed and wonders if she is supposed to stoptaking her beta blockers while she is wearing the monitor

## 2021-10-04 DIAGNOSIS — B00.9 HERPES INFECTION: Primary | ICD-10-CM

## 2021-10-04 RX ORDER — ACYCLOVIR 400 MG/1
400 TABLET ORAL 3 TIMES DAILY
Qty: 21 TABLET | Refills: 0 | Status: SHIPPED | OUTPATIENT
Start: 2021-10-04 | End: 2021-10-11

## 2021-10-04 NOTE — TELEPHONE ENCOUNTER
Name of Medication? Other - Aciclovir   Patient-reported dosage and instructions? PATIENT REQUESTING Aciclovir TO   HELP   How many days do you have left? 0   Preferred Pharmacy? [address] [place] [address]   Pharmacy phone number (if available)? [phone]   Additional Information for Provider?  SORE NOT GOING AWAY, NEEDS MEDICATION

## 2021-10-04 NOTE — TELEPHONE ENCOUNTER
Let pt know I sent acyclovir in for her to the [facility] on [address]

## 2021-10-11 ENCOUNTER — TELEPHONE (OUTPATIENT)
Dept: FAMILY MEDICINE CLINIC | Age: 31
End: 2021-10-11

## 2021-10-11 NOTE — TELEPHONE ENCOUNTER
----- Message from Hui Avila sent at 10/11/2021  8:40 AM EDT -----  Subject: Results Request    QUESTIONS  Which lab or imaging result is the patient calling about? Holter Monitor  Which provider ordered the test? Jayce Viveros   At what location was the test performed? [facility]  Date the test was performed? Additional Information for Provider? Patient called stating she had holter   monitor done 3 weeks ago at [facility] and still has not received results. Pt is requesting Maicol put in another request for these results  ---------------------------------------------------------------------------  --------------  CALL BACK INFO  What is the best way for the office to contact you? OK to leave message on   voicemail  Preferred Call Back Phone Number?  [phone]

## 2021-10-11 NOTE — TELEPHONE ENCOUNTER
Phoned [facility] medical records,was transferred to [facility] in cardiology, she states the dr is reading the results today and will fax us the results

## 2021-11-10 ENCOUNTER — PATIENT MESSAGE (OUTPATIENT)
Dept: FAMILY MEDICINE CLINIC | Age: 31
End: 2021-11-10

## 2021-11-10 NOTE — TELEPHONE ENCOUNTER
From: Anne Marie Starks  To: Yossi Cornejo  Sent: 11/10/2021 11:53 AM EST  Subject: Visit Follow-Up Question    Hello I have an appt on 11/22 I was hoping maybe I could switch that appointment to Monday the 15th? And have my test results from my heart monitor came back. I can't get ahold of them.    Thank you

## 2021-11-22 ENCOUNTER — OFFICE VISIT (OUTPATIENT)
Dept: FAMILY MEDICINE CLINIC | Age: 31
End: 2021-11-22
Payer: MEDICARE

## 2021-11-22 ENCOUNTER — TELEPHONE (OUTPATIENT)
Dept: CARDIOLOGY CLINIC | Age: 31
End: 2021-11-22

## 2021-11-22 VITALS
HEIGHT: 68 IN | TEMPERATURE: 98.3 F | RESPIRATION RATE: 14 BRPM | WEIGHT: 283.8 LBS | HEART RATE: 80 BPM | SYSTOLIC BLOOD PRESSURE: 140 MMHG | DIASTOLIC BLOOD PRESSURE: 88 MMHG | BODY MASS INDEX: 43.01 KG/M2

## 2021-11-22 DIAGNOSIS — R51.9 ACUTE NONINTRACTABLE HEADACHE, UNSPECIFIED HEADACHE TYPE: ICD-10-CM

## 2021-11-22 DIAGNOSIS — R00.2 HEART PALPITATIONS: ICD-10-CM

## 2021-11-22 DIAGNOSIS — R06.83 SNORES: ICD-10-CM

## 2021-11-22 DIAGNOSIS — R53.83 FATIGUE, UNSPECIFIED TYPE: ICD-10-CM

## 2021-11-22 DIAGNOSIS — I10 ESSENTIAL HYPERTENSION: Primary | ICD-10-CM

## 2021-11-22 PROCEDURE — 99214 OFFICE O/P EST MOD 30 MIN: CPT | Performed by: NURSE PRACTITIONER

## 2021-11-22 PROCEDURE — G8427 DOCREV CUR MEDS BY ELIG CLIN: HCPCS | Performed by: NURSE PRACTITIONER

## 2021-11-22 PROCEDURE — 1036F TOBACCO NON-USER: CPT | Performed by: NURSE PRACTITIONER

## 2021-11-22 PROCEDURE — G8417 CALC BMI ABV UP PARAM F/U: HCPCS | Performed by: NURSE PRACTITIONER

## 2021-11-22 PROCEDURE — G8484 FLU IMMUNIZE NO ADMIN: HCPCS | Performed by: NURSE PRACTITIONER

## 2021-11-22 RX ORDER — IBUPROFEN 600 MG/1
600 TABLET ORAL EVERY 6 HOURS PRN
Qty: 120 TABLET | Refills: 3 | Status: SHIPPED | OUTPATIENT
Start: 2021-11-22 | End: 2021-11-29 | Stop reason: ALTCHOICE

## 2021-11-22 RX ORDER — OMEPRAZOLE 40 MG/1
40 CAPSULE, DELAYED RELEASE ORAL DAILY
COMMUNITY

## 2021-11-22 ASSESSMENT — ENCOUNTER SYMPTOMS
RESPIRATORY NEGATIVE: 1
BACK PAIN: 0
RHINORRHEA: 0
SINUS PAIN: 0
GASTROINTESTINAL NEGATIVE: 1
SINUS PRESSURE: 0

## 2021-11-22 NOTE — TELEPHONE ENCOUNTER
Diagnosis Information    Diagnosis   I10 (ICD-10-CM) - Essential hypertension   R00.2 (ICD-10-CM) - Heart palpitations   R53.83 (ICD-10-CM) - Fatigue, unspecified type     Lm for pt to call office

## 2021-11-22 NOTE — PROGRESS NOTES
[facility] MEDICINE  [address] [place] [address]  Dept: [phone]  Dept Fax: [phone]  Loc: Lavinia Kirkland is a 33 y.o. femalewho presents today for her medical conditions/complaints as noted below. Elissa 66 c/o of Follow-up (last pap 2019) and Medication Refill (med pended)      HPI:      Pt her to f/u on BP and to go over 48 hour holter test. Pt had a history of PIH and was started on lisinopril after pregnancy through ER for elevated BP, had a reaction to it and went back to ER and was changed over to metoprolol 25 mg daily. Has been taking this and states headaches have improved a bit and heart palpitations have decreased. But still feels winded most of the time, feels tired a lot thinks this BP med is not working. Did not take it yet today. HTN    Does patient check BP regularly at home? - No  Current Medication regimen - meotprolol 25 mg daily   Tolerating medications well? - no, thinks having SE such as fatigue and SOB with it. Shortness of breath or chest pain? Yes - as above   Headache or visual complaints? Yes  Neurologic changes like confusion? No  Extremity edema? Yes - her feel do swell at times. BP Readings from Last 3 Encounters:  11/22/21 : (!) 144/90  09/20/21 : 138/80  06/14/21 : 127/77    Headache    HPI:    Description of HA - all over, they can come everyday, has been taking a lot of tylenol for them. Symptoms have been present for 2 month(s). Inciting event? - Yes - states they all started when she started having increased BP and reactions to BP meds. Did have a normal ct of head at [facility] in the last 2 months. Severity - 6/10  History of migraines? -  unsure  Treatment tried and response - tylenol and beta blocker.  Will stop tylenol and trial ibuprofen and monitor     Fever over 100.5- No  Neck stiffness - No  Focal weakness/paresthesias of arm/leg - No  Nausea/vomiting - Yes - will get nauseous at times. \"Worst headache ever\" - No  Confusion - No  Photophobia/Phonophobia - Yes  Changes in vision - No      48 hour holter reviewed. Pt tired when she wakes up does get tired during the day , states she snores a lot at night. Lab Results       Component                Value               Date                       TSH                      1.05                2021                 T4FREE                   1.12                2017            Will check CBC and BMP           Current Outpatient Medications   Medication Sig Dispense Refill    omeprazole (PRILOSEC) 40 MG delayed release capsule Take 40 mg by mouth daily      ibuprofen (ADVIL;MOTRIN) 600 MG tablet Take 1 tablet by mouth every 6 hours as needed for Pain 120 tablet 3    metoprolol tartrate (LOPRESSOR) 25 MG tablet Take 1 tablet by mouth daily 90 tablet 1    ketorolac (TORADOL) 10 MG tablet Take 1 tablet by mouth every 8 hours as needed for Pain (Patient not taking: Reported on 2021) 15 tablet 0    pantoprazole (PROTONIX) 40 MG tablet TAKE 1 TABLET BY MOUTH IN THE MORNING (Patient not taking: Reported on 2021)      famotidine (PEPCID) 40 MG tablet TAKE 1 TABLET BY MOUTH EVERY DAY AT BEDTIME AS NEEDED       No current facility-administered medications for this visit.           Past Medical History:   Diagnosis Date    Biliary dyskinesia     GERD (gastroesophageal reflux disease)     PONV (postoperative nausea and vomiting)     Seasonal allergies       Past Surgical History:   Procedure Laterality Date     SECTION  10/15/2010     SECTION  2019    CHOLECYSTECTOMY, LAPAROSCOPIC N/A 6/3/2021    CHOLECYSTECTOMY LAPAROSCOPIC ROBOTIC performed by Andreina Steinberg MD at [address] [place] [address]  2019    UPPER GASTROINTESTINAL ENDOSCOPY  2020    [address] [place] [address]     Family History   Problem Relation Age of Onset    Heart Disease Father     Stroke Father     High Blood Pressure Father     High Cholesterol Father     Kidney Disease Father     Other Father         blood disorder MTHFR    No Known Problems Mother     No Known Problems Brother     No Known Problems Maternal Grandmother     No Known Problems Maternal Grandfather     No Known Problems Paternal Grandmother     No Known Problems Brother      Social History     Tobacco Use    Smoking status: Former Smoker     Packs/day: 0.25     Types: Cigarettes     Quit date: 9/1/2011     Years since quitting: 10.2    Smokeless tobacco: Never Used    Tobacco comment: 1 pack every 2 days for 3 yrs   Substance Use Topics    Alcohol use: No        No Known Allergies    Health Maintenance   Topic Date Due    Cervical cancer screen  04/10/2021    Flu vaccine (1) 09/01/2021    DTaP/Tdap/Td vaccine (8 - Td or Tdap) 05/28/2029    Hepatitis B vaccine  Completed    Hib vaccine  Completed    COVID-19 Vaccine  Completed    Hepatitis C screen  Completed    HIV screen  Completed    Hepatitis A vaccine  Aged Out    Meningococcal (ACWY) vaccine  Aged Out    Pneumococcal 0-64 years Vaccine  Aged Out    Varicella vaccine  Discontinued       Subjective:      Review of Systems   Constitutional: Positive for fatigue. Negative for chills and fever. HENT: Negative for congestion, rhinorrhea, sinus pressure, sinus pain and sneezing. Respiratory: Negative. Cardiovascular: Positive for leg swelling (stes her LE will swell at ankles sometimes. ). Gastrointestinal: Negative. Genitourinary: Negative for difficulty urinating and dysuria. Musculoskeletal: Negative for arthralgias, back pain, gait problem and myalgias. Skin: Negative. Neurological: Positive for headaches. Negative for dizziness, facial asymmetry, weakness and light-headedness. Psychiatric/Behavioral: Negative for self-injury, sleep disturbance and suicidal ideas.        Objective:      BP (!) 144/90   Pulse 80   Temp 98.3 °F (36.8 °C) (Oral)   Resp 14 Ht 5' 8\" (1.727 m)   Wt 283 lb 12.8 oz (128.7 kg)   BMI 43.15 kg/m²      Physical Exam  Vitals and nursing note reviewed. Constitutional:       Appearance: She is not ill-appearing. HENT:      Nose: Nose normal.      Mouth/Throat:      Mouth: Mucous membranes are moist.   Cardiovascular:      Rate and Rhythm: Normal rate and regular rhythm. Pulses: Normal pulses. Heart sounds: Normal heart sounds. No murmur heard. Pulmonary:      Effort: Pulmonary effort is normal. No respiratory distress. Breath sounds: Normal breath sounds. Musculoskeletal:      Right lower leg: No edema. Left lower leg: No edema. Skin:     General: Skin is warm and dry. Capillary Refill: Capillary refill takes less than 2 seconds. Neurological:      General: No focal deficit present. Mental Status: She is alert. Cranial Nerves: Cranial nerves are intact. Sensory: Sensation is intact. Motor: Motor function is intact. Coordination: Coordination is intact. Gait: Gait is intact. Psychiatric:         Mood and Affect: Mood normal.         Behavior: Behavior normal.         Thought Content: Thought content normal.         Judgment: Judgment normal.          Assessment/Plan:           1. Essential hypertension  Continue with current  BP med for now   - ECHO Complete 2D W Doppler W Color; Future  - Jonna Lee MD, Cardiology, [facility]    2. Heart palpitations    - ECHO Complete 2D W Doppler W Color; Future  - Jonna Lee MD, Cardiology, [facility]    3. Snores  R/o sleep apnea  [facility]; Future    4. Fatigue, unspecified type  R/o sleep apnea vs diabetes vs anemia vs SE fo BP med   - [facility] Metabolic Panel; Future  - Jonna Lee MD, Cardiology, [facility]    5.  Acute nonintractable headache, unspecified headache type  Monitor  Trial ibuprofen  Will assess at next visit if still having. Get eyes checked to r/o optic pathology     Pt in agreement with plan   Return in about 6 weeks (around 1/3/2022) for f/u headache. Reccommended tobaccocessation options including pharmacologic methods, counseled great than 3 minutesduring this visit:  Yes[]  No  []       Patient given educational materials -see patient instructions. Discussed use, benefit, and side effects of prescribedmedications. All patient questions answered. Pt voiced understanding. Reviewedhealth maintenance. Instructed to continue current medications, diet and exercise. Patient agreed with treatment plan. Follow up as directed.        Electronicallysigned by DIANNA Frias CNP on 11/22/2021 at 1:00 PM

## 2021-11-23 ENCOUNTER — HOSPITAL ENCOUNTER (OUTPATIENT)
Age: 31
Setting detail: SPECIMEN
Discharge: HOME | End: 2021-11-23

## 2021-11-23 LAB
ABSOLUTE EOS #: 0.22 K/UL (ref 0–0.44)
ABSOLUTE IMMATURE GRANULOCYTE: 0.04 K/UL (ref 0–0.3)
ABSOLUTE LYMPH #: 2.51 K/UL (ref 1.1–3.7)
ABSOLUTE MONO #: 0.73 K/UL (ref 0.1–1.2)
ANION GAP SERPL CALCULATED.3IONS-SCNC: 13 MMOL/L (ref 9–17)
BASOPHILS # BLD: 1 % (ref 0–2)
BASOPHILS ABSOLUTE: 0.06 K/UL (ref 0–0.2)
BUN BLDV-MCNC: 8 MG/DL (ref 6–20)
BUN/CREAT BLD: ABNORMAL (ref 9–20)
CALCIUM SERPL-MCNC: 9.3 MG/DL (ref 8.6–10.4)
CHLORIDE BLD-SCNC: 106 MMOL/L (ref 98–107)
CO2: 22 MMOL/L (ref 20–31)
CREAT SERPL-MCNC: 0.48 MG/DL (ref 0.5–0.9)
DIFFERENTIAL TYPE: NORMAL
EOSINOPHILS RELATIVE PERCENT: 2 % (ref 1–4)
GFR AFRICAN AMERICAN: >60 ML/MIN
GFR NON-AFRICAN AMERICAN: >60 ML/MIN
GFR SERPL CREATININE-BSD FRML MDRD: ABNORMAL ML/MIN/{1.73_M2}
GFR SERPL CREATININE-BSD FRML MDRD: ABNORMAL ML/MIN/{1.73_M2}
GLUCOSE BLD-MCNC: 109 MG/DL (ref 70–99)
HCT VFR BLD CALC: 39.3 % (ref 36.3–47.1)
HEMOGLOBIN: 12.4 G/DL (ref 11.9–15.1)
IMMATURE GRANULOCYTES: 0 %
LYMPHOCYTES # BLD: 26 % (ref 24–43)
MCH RBC QN AUTO: 27.2 PG (ref 25.2–33.5)
MCHC RBC AUTO-ENTMCNC: 31.6 G/DL (ref 28.4–34.8)
MCV RBC AUTO: 86.2 FL (ref 82.6–102.9)
MONOCYTES # BLD: 8 % (ref 3–12)
NRBC AUTOMATED: 0 PER 100 WBC
PDW BLD-RTO: 12.8 % (ref 11.8–14.4)
PLATELET # BLD: 311 K/UL (ref 138–453)
PLATELET ESTIMATE: NORMAL
PMV BLD AUTO: 11.4 FL (ref 8.1–13.5)
POTASSIUM SERPL-SCNC: 4.3 MMOL/L (ref 3.7–5.3)
RBC # BLD: 4.56 M/UL (ref 3.95–5.11)
RBC # BLD: NORMAL 10*6/UL
SEG NEUTROPHILS: 63 % (ref 36–65)
SEGMENTED NEUTROPHILS ABSOLUTE COUNT: 6.06 K/UL (ref 1.5–8.1)
SODIUM BLD-SCNC: 141 MMOL/L (ref 135–144)
WBC # BLD: 9.6 K/UL (ref 3.5–11.3)
WBC # BLD: NORMAL 10*3/UL

## 2021-11-24 ENCOUNTER — TELEPHONE (OUTPATIENT)
Dept: FAMILY MEDICINE CLINIC | Age: 31
End: 2021-11-24

## 2021-11-24 DIAGNOSIS — R06.83 SNORES: ICD-10-CM

## 2021-11-24 DIAGNOSIS — R53.83 FATIGUE, UNSPECIFIED TYPE: ICD-10-CM

## 2021-11-24 NOTE — TELEPHONE ENCOUNTER
----- Message from DIANNA Perez CNP sent at 11/24/2021 11:06 AM EST -----  Let pt know labs look good. Fasting sugar is creeping up , at 109 recommend limiting sugar and carbohydrate intake to improve sugar levels. Also increasing physical activity laura also bring sugar levels down. Renal function in normal range.   Let me know if questions

## 2021-11-29 ENCOUNTER — OFFICE VISIT (OUTPATIENT)
Dept: CARDIOLOGY CLINIC | Age: 31
End: 2021-11-29
Payer: MEDICARE

## 2021-11-29 ENCOUNTER — TELEPHONE (OUTPATIENT)
Dept: FAMILY MEDICINE CLINIC | Age: 31
End: 2021-11-29

## 2021-11-29 VITALS
HEIGHT: 68 IN | BODY MASS INDEX: 42.74 KG/M2 | SYSTOLIC BLOOD PRESSURE: 136 MMHG | WEIGHT: 282 LBS | DIASTOLIC BLOOD PRESSURE: 74 MMHG | HEART RATE: 80 BPM

## 2021-11-29 DIAGNOSIS — R00.2 PALPITATIONS: Primary | ICD-10-CM

## 2021-11-29 PROCEDURE — 99204 OFFICE O/P NEW MOD 45 MIN: CPT | Performed by: INTERNAL MEDICINE

## 2021-11-29 PROCEDURE — G8427 DOCREV CUR MEDS BY ELIG CLIN: HCPCS | Performed by: INTERNAL MEDICINE

## 2021-11-29 PROCEDURE — 93000 ELECTROCARDIOGRAM COMPLETE: CPT | Performed by: INTERNAL MEDICINE

## 2021-11-29 PROCEDURE — G8484 FLU IMMUNIZE NO ADMIN: HCPCS | Performed by: INTERNAL MEDICINE

## 2021-11-29 PROCEDURE — 1036F TOBACCO NON-USER: CPT | Performed by: INTERNAL MEDICINE

## 2021-11-29 PROCEDURE — G8417 CALC BMI ABV UP PARAM F/U: HCPCS | Performed by: INTERNAL MEDICINE

## 2021-11-29 RX ORDER — AMOXICILLIN 500 MG/1
500 CAPSULE ORAL 3 TIMES DAILY
Qty: 30 CAPSULE | Refills: 0 | Status: SHIPPED | OUTPATIENT
Start: 2021-11-29 | End: 2021-12-09

## 2021-11-29 NOTE — PROGRESS NOTES
NP here for HTN, fatigue and palpitations. Palpitations have improved since starting Metoprolol. EKG completed.

## 2021-11-29 NOTE — PROGRESS NOTES
[address] [address] [facility] [address]  Dept: [phone]  Dept Fax: [phone]  Loc: [phone]    Visit Date: 11/29/2021    Ms. Tosin Hankins is a 33 y.o. female  who presented for:  Chief Complaint   Patient presents with    Hypertension    Palpitations    Fatigue       HPI:   34 y/o patient presents with complaints of hypertension, palpitations, and fatigue. Says her palpitations began 5 years ago and at the time was told she was feeling her ventricular contractions. Palpitations became more frequent over this past year. She has felt them once after starting Lopressor 1.5 months ago, but prior to this she felt palpitations at least 3x/day and this would be daily some weeks, and not at all during others. She states she can feel her heart pounding and the palpitations were \"deep\" and intermittent throughout the day. Each episode lasts for a few seconds and she notices them in passing. She states her coffee intake is likely \"way too much\" and that she has at least one cup of coffee in the morning and a can of pop later. She says this is the baseline and she drinks typically much more than this at least 5 days/week. She feels palpitations were random and not associated with stress. She also began to have high blood pressure during the last 6 months of her pregnancy (late 2018/early 2019). She was not aware if her BP has always remained high, but was told it needed to be controlled when she had a cholecystectomy in June of 2021. She checked her BP on a home monitor daily for weeks and says her BP ranged around 170/80-90. She went to the ER 1.5 months ago due to not feeling well and states her BP was [id number] diastolic value. She has been on Lopressor 25mg once daily ever since. She also complains of feeling more fatigued since starting the beta blocker.  In the last 3 months she was also on Lisinopril briefly, but states she had many side effects and was taken off of the medication. Current Outpatient Medications:     omeprazole (PRILOSEC) 40 MG delayed release capsule, Take 40 mg by mouth daily, Disp: , Rfl:     metoprolol tartrate (LOPRESSOR) 25 MG tablet, Take 1 tablet by mouth daily, Disp: 90 tablet, Rfl: 1    amoxicillin (AMOXIL) 500 MG capsule, Take 1 capsule by mouth 3 times daily for 10 days, Disp: 30 capsule, Rfl: 0    Past Medical History  Saeid Clark  has a past medical history of Biliary dyskinesia, GERD (gastroesophageal reflux disease), PONV (postoperative nausea and vomiting), and Seasonal allergies. Social History  Saeid Clark  reports that she quit smoking about 10 years ago. Her smoking use included cigarettes. She smoked 0.25 packs per day. She has never used smokeless tobacco. She reports that she does not drink alcohol and does not use drugs. Family History  Hellen family history includes Heart Disease in her father; High Blood Pressure in her father; High Cholesterol in her father; Kidney Disease in her father; No Known Problems in her brother, brother, maternal grandfather, maternal grandmother, mother, and paternal grandmother; Other in her father; Stroke in her father. Past Surgical History   Past Surgical History:   Procedure Laterality Date     SECTION  10/15/2010     SECTION  2019    CHOLECYSTECTOMY, LAPAROSCOPIC N/A 6/3/2021    CHOLECYSTECTOMY LAPAROSCOPIC ROBOTIC performed by Ashli Persaud MD at [address] [place] [address]  2019    UPPER GASTROINTESTINAL ENDOSCOPY  2020    [facility]       Subjective:     REVIEW OF SYSTEMS  Constitutional: denies sweats, chills and fever  HENT: denies congestion, sinus pressure, sneezing and sore throat. Eyes: denies pain, discharge, redness and itching. Respiratory: denies apnea, cough  CV: As stated in HPI.   Gastrointestinal: denies blood in stool, constipation, diarrhea   Endocrine: denies cold intolerance, heat intolerance, polydipsia. Genitourinary: denies dysuria, enuresis, flank pain and hematuria. Musculoskeletal: denies arthralgias, joint swelling and neck pain. Neurological: denies numbness and headaches. Psychiatric/Behavioral: denies agitation, confusion, decreased concentration and dysphoric mood    All others reviewed and are negative. Objective:     /74   Pulse 80   Ht 5' 8\" (1.727 m)   Wt 282 lb (127.9 kg)   BMI 42.88 kg/m²     Wt Readings from Last 3 Encounters:   11/29/21 282 lb (127.9 kg)   11/22/21 283 lb 12.8 oz (128.7 kg)   09/20/21 279 lb 3.2 oz (126.6 kg)     BP Readings from Last 3 Encounters:   11/29/21 136/74   11/22/21 (!) 140/88   09/20/21 138/80       PHYSICAL EXAM  Constitutional: Oriented to person, place, and time. Appears well-developed and well-nourished. HENT:   Head: Normocephalic and atraumatic. Eyes: EOM are normal. Pupils are equal, round, and reactive to light. Neck: Normal range of motion. Neck supple. No JVD present. Cardiovascular: Normal rate, normal heart sounds and intact distal pulses. Pulmonary/Chest: Effort normal and breath sounds normal. No respiratory distress. No wheezes. No rales. Abdominal: Soft. Bowel sounds are normal. No distension. There is no tenderness. Musculoskeletal: Normal range of motion. No edema. Neurological: Alert and oriented to person, place, and time. No cranial nerve deficit. Coordination normal.   Skin: Skin is warm and dry. Psychiatric: Normal mood and affect.        No results found for: CKTOTAL, CKMB, CKMBINDEX    Lab Results   Component Value Date    WBC 9.6 11/23/2021    RBC 4.56 11/23/2021    RBC 4.86 08/25/2017    HGB 12.4 11/23/2021    HCT 39.3 11/23/2021    MCV 86.2 11/23/2021    MCH 27.2 11/23/2021    MCHC 31.6 11/23/2021    RDW 12.8 11/23/2021     11/23/2021    MPV 11.4 11/23/2021       Lab Results   Component Value Date     11/23/2021    K 4.3 11/23/2021     11/23/2021    CO2 22 11/23/2021    BUN 8 11/23/2021    LABALBU 4.2 05/07/2021    CREATININE 0.48 11/23/2021    CALCIUM 9.3 11/23/2021    GFRAA >60 11/23/2021    LABGLOM >60 11/23/2021    GLUCOSE 109 11/23/2021    GLUCOSE 101 08/25/2017       Lab Results   Component Value Date    ALKPHOS 90 05/07/2021    ALT 63 05/07/2021    AST 31 05/07/2021    PROT 7.3 05/07/2021    BILITOT 0.2 05/07/2021    BILIDIR <0.2 05/07/2021    LABALBU 4.2 05/07/2021       Lab Results   Component Value Date    MG 1.9 08/25/2017       Lab Results   Component Value Date    PROTIME 12.7 03/30/2016    PROTIME 12.7 03/30/2016    PROTIME 10.4 05/22/2015         No results found for: LABA1C    Lab Results   Component Value Date    TRIG 137 09/22/2021    HDL 35 09/22/2021    LDLCALC 123 03/21/2016    LABVLDL 28 03/21/2016       Lab Results   Component Value Date    TSH 1.05 09/22/2021         Testing Reviewed:      I haveindividually reviewed the below cardiac tests    EKG:    ECHO: No results found for this or any previous visit. STRESS:    CATH:    Assessment/Plan       Diagnosis Orders   1. Palpitations  EKG 12 lead     Palpitations  Elevated blood pressure   Fatigue    Has a lot of faitgue due to beta blockers  Advised to stop and montior symptoms  May need another holter if symptoms reoccur  Has not had any symptoms for a while now  Recommend placing on a 30 day Holter monitor for palpitations   Keep a blood pressure log to evaluate at a follow up visit  The patient is asked to make an attempt to improve diet and exercise patterns to aid in medical management of this problem. Advised patient to call office or seek immediate medical attention if there is any new onset of any chest pain, sob, palpitations, lightheadedness, dizziness, orthopnea, PND or pedal edema. All medication side effects were discussed in details. Thank you for allowing me to participate in the care of this patient. Please do not hesitate to contact me for any further questions. Return in about 3 months (around 2/28/2022), or if symptoms worsen or fail to improve, for Review testing, Regular follow up.        Electronically signed by Teetee Montgomery MD [facility]  11/29/2021 at 2:05 PM EST

## 2021-11-29 NOTE — TELEPHONE ENCOUNTER
Patient states that she has a really bad sore throat sxs x 2 days and states that she usually gets strep once a year and figured that's what is going on and would like to know if she needs to be seen again or if Maicol would be willing to call something in to her pharmacy    Please advise patient

## 2021-11-30 ENCOUNTER — TELEPHONE (OUTPATIENT)
Dept: FAMILY MEDICINE CLINIC | Age: 31
End: 2021-11-30

## 2021-11-30 ENCOUNTER — TELEPHONE (OUTPATIENT)
Dept: CARDIOLOGY CLINIC | Age: 31
End: 2021-11-30

## 2021-11-30 NOTE — TELEPHONE ENCOUNTER
Echo 12/7/21 @[facility], second floor heart and vasc. No prep. 9:15am arrival with a 9:30 start. Detailed message left with echo details, ok per kirsten.

## 2021-11-30 NOTE — TELEPHONE ENCOUNTER
Pt was here to see you yesterday, she said you never mentioned anything about an echo. Pt states her PCP called and set one up, and is not sure why. Pt does not want to have this if she thinks you feel it is not necessary. What is your take on this?

## 2021-12-21 ENCOUNTER — TELEPHONE (OUTPATIENT)
Dept: CARDIOLOGY CLINIC | Age: 31
End: 2021-12-21

## 2021-12-21 DIAGNOSIS — R00.2 PALPITATIONS: Primary | ICD-10-CM

## 2021-12-21 NOTE — TELEPHONE ENCOUNTER
From 11/29/21 visit:     \"Has a lot of faitgue due to beta blockers  Advised to stop and montior symptoms  May need another holter if symptoms reoccur  Has not had any symptoms for a while now  Recommend placing on a 30 day Holter monitor for palpitations \"

## 2021-12-21 NOTE — TELEPHONE ENCOUNTER
Pt states that when she saw Dr Shama Pisano on 11/29/21, she was told to call back if she was still having symptoms and he would order further testing. I mentioned that there was an order for an Echo, and I offered to transfer her to , but she said the PCP ordered the Echo. She also said that she had a holter monitor in November, then started talking about beta blockers.   Please call her at [phone]

## 2022-01-13 ENCOUNTER — TELEPHONE (OUTPATIENT)
Dept: CARDIOLOGY CLINIC | Age: 32
End: 2022-01-13

## 2022-01-13 NOTE — TELEPHONE ENCOUNTER
Pt did not have cardiac event monitor applied as scheduled 01-11-22  Pt states had to work that day, work schedule is crazy right now, will call to reschedule when available

## 2022-02-01 ENCOUNTER — INITIAL CONSULT (OUTPATIENT)
Dept: PULMONOLOGY | Age: 32
End: 2022-02-01
Payer: MEDICARE

## 2022-02-01 VITALS
HEART RATE: 91 BPM | SYSTOLIC BLOOD PRESSURE: 142 MMHG | DIASTOLIC BLOOD PRESSURE: 82 MMHG | HEIGHT: 68 IN | OXYGEN SATURATION: 98 % | BODY MASS INDEX: 43.22 KG/M2 | WEIGHT: 285.2 LBS

## 2022-02-01 DIAGNOSIS — I10 BENIGN ESSENTIAL HTN: ICD-10-CM

## 2022-02-01 DIAGNOSIS — E66.01 MORBID OBESITY WITH BMI OF 40.0-44.9, ADULT (HCC): ICD-10-CM

## 2022-02-01 DIAGNOSIS — G47.33 OSA (OBSTRUCTIVE SLEEP APNEA): Primary | ICD-10-CM

## 2022-02-01 PROCEDURE — G8484 FLU IMMUNIZE NO ADMIN: HCPCS | Performed by: INTERNAL MEDICINE

## 2022-02-01 PROCEDURE — G8427 DOCREV CUR MEDS BY ELIG CLIN: HCPCS | Performed by: INTERNAL MEDICINE

## 2022-02-01 PROCEDURE — 99203 OFFICE O/P NEW LOW 30 MIN: CPT | Performed by: INTERNAL MEDICINE

## 2022-02-01 PROCEDURE — 1036F TOBACCO NON-USER: CPT | Performed by: INTERNAL MEDICINE

## 2022-02-01 PROCEDURE — G8417 CALC BMI ABV UP PARAM F/U: HCPCS | Performed by: INTERNAL MEDICINE

## 2022-02-01 NOTE — PROGRESS NOTES
New Sleep Patient H/P    Presentation:  Yaneth Bruce is referred by Medhat Franklin CNP      Yaneth Bruce is referred by cardiology and PCP due to recent w/u for HTN, currently off medications. BMI 43.36, c/o sleep related choking sensation, loud snoring, nocturnal headaches, non restorative, fragmented sleep, difficulty falling and maintaining sleep. Feels tired and sleepy at work, drinks at least 3 caffeinated beverages and sometime a B12 pill. HTN--Nallu       Time in Bed:   Bedtime: 11p.m. Awakens  5:30 a.m. Different on weekends? No       Hellen falls asleep in 25  minutes. Any awakenings? Yes  Difficulty Falling back to sleep? No  Symptoms began:  3  years ago. Symptoms include: snoring, choking, gasping, excessive daytime sleepiness, disrupted sleep    Previous evaluation and treatment? No      She denies any history of sleep walking or sleep talking. No history of seizures activity. No history suggestive of restless legs syndrome. No history of bruxism. No history of head injury. Naps:  Any naps? No a    Snoring and Apneas:  Do you snore or been told you a snore? Yes  How long have known about your snoring? years  Any witnessed apneas? No  Any awakenings with choking or gasping? Yes    Dreams:  Any recurring dreams? No  Hallucinations? No  Sleep Paralysis? No  Symptoms of Cataplexy? No    Driving History:  Do you have a CDL or drive long distances for work? No  Any driving accidents in the past year? No  Any sleepiness while driving? No    Weight:  Any change in weight over the past year? Yes   How about past 5 years?  Yes  How much? 48        Past Medical History:   Diagnosis Date    Biliary dyskinesia     GERD (gastroesophageal reflux disease)     PONV (postoperative nausea and vomiting)     Seasonal allergies        Past Surgical History:   Procedure Laterality Date     SECTION  10/15/2010     SECTION  2019    CHOLECYSTECTOMY, LAPAROSCOPIC N/A 6/3/2021    CHOLECYSTECTOMY LAPAROSCOPIC ROBOTIC performed by Inga Zavaleta MD at [facility]  02/01/2019    UPPER GASTROINTESTINAL ENDOSCOPY  08/03/2020    Nate       Social History     Tobacco Use    Smoking status: Former Smoker     Packs/day: 0.25     Types: Cigarettes     Quit date: 9/1/2011     Years since quitting: 10.4    Smokeless tobacco: Never Used    Tobacco comment: 1 pack every 2 days for 3 yrs   Vaping Use    Vaping Use: Former   Substance Use Topics    Alcohol use: No    Drug use: No         Current Outpatient Medications   Medication Sig Dispense Refill    omeprazole (PRILOSEC) 40 MG delayed release capsule Take 40 mg by mouth daily       No current facility-administered medications for this visit. Family History   Problem Relation Age of Onset    Heart Disease Father     Stroke Father     High Blood Pressure Father     High Cholesterol Father     Kidney Disease Father     Other Father         blood disorder MTHFR    No Known Problems Mother     No Known Problems Brother     No Known Problems Maternal Grandmother     No Known Problems Maternal Grandfather     No Known Problems Paternal Grandmother     No Known Problems Brother         Any family history of any sleep problems or any one in your family on CPAP? No    Social History     Tobacco Use    Smoking status: Former Smoker     Packs/day: 0.25     Types: Cigarettes     Quit date: 9/1/2011     Years since quitting: 10.4    Smokeless tobacco: Never Used    Tobacco comment: 1 pack every 2 days for 3 yrs   Vaping Use    Vaping Use: Former   Substance Use Topics    Alcohol use: No    Drug use: No     Caffeine Intake: How much soda (pop), coffee, tea, power drinks do you ingest per day? 3 per day. 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  12h shifts x 3 a week.         Review of Systems:   General/Constitutional: No recent loss of weight or appetite changes. No fever or chills. HENT: Negative. Eyes: Negative. Upper respiratory tract: No nasal stuffiness or post nasal drip. Lower respiratory tract/ lungs: No cough or sputum production. No hemoptysis. Cardiovascular: No palpitations or chest pain. Gastrointestinal: No nausea or vomiting. Neurological: No focal neurologiacal weakness. Extremities: No edema. Musculoskeletal: No complaints. Genitourinary: No complaints. Hematological: Negative. Psychiatric/Behavioral: Negative. Skin: No itching. Physical Exam:    HEIGHTHeight: 5' 8\" (172.7 cm) WEIGHTWeight: 285 lb 3.2 oz (129.4 kg)    BMI: 43.36 Neck Size:17   Oxygen Sat: room air    ESS: 0  Vitals:   Vitals:    02/01/22 0935   BP: (!) 142/82   Pulse: 91   SpO2: 98%          Mallampati Score: 3    Physical Exam :BMI 43  HENT:   Head: Normocephalic and atraumatic. Mouth/Throat: Oropharynx is clear and moist. No oral thrush. Large tongue, crowded pharynx, mallampati class 2 large tonsils for age. Eyes: Conjunctivae are normal. PERRLA. No scleral icterus. Neck: Neck supple. No JVD present. No tracheal deviation present. 17 in. Cardiovascular: Normal rate, regular rhythm, normal heart sounds. No murmur heard. Pulmonary/Chest: Effort normal and breath sounds normal. No stridor. No respiratory distress. No wheezes. No rales. Abdominal: Soft. No distension. No tenderness. Musculoskeletal: Normal range of motion. Lymphadenopathy:  No cervical adenopathy. Neurological: Alert and oriented to person, place, and time. No focal deficits. Skin: Skin is warm and dry. Patient is not diaphoretic. Psychiatric: Normal behavior with normal mood and affect. Diagnostic Data:    Assessment    Diagnosis Orders   1. NORMA (obstructive sleep apnea)  Home Sleep Study   2. Morbid obesity with BMI of 40.0-44.9, adult ([facility]  Home Sleep Study   3.  Benign essential HTN  Home Sleep Study         Plan   Orders Placed This Encounter Procedures    Home Sleep Study     Standing Status:   Future     Standing Expiration Date:   2/1/2023     Order Specific Question:   Location For Sleep Study     Answer:   [facility] [address]     Order Specific Question:   Select Sleep Lab Location     Answer:   [address]          Mask Desensitization and Pre study teaching? No  Weight Loss Information Given? Yes  Sleep Hygiene Discussed? Yes    -She was advised to call [employer] regarding supplies if needed.  -She call my office for earlier appointment if needed for worsening of sleep symptoms.  -Serafin Burton educated about my impression and plan. Patient verbalizes understanding.

## 2022-02-01 NOTE — PATIENT INSTRUCTIONS
Patient Education        Sleep Apnea: Care Instructions  Overview     Sleep apnea means that you frequently stop breathing for 10 seconds or longer during sleep. It can be mild to severe, based on the number of times an hour that you stop breathing. Blocked or narrowed airways in your nose, mouth, or throat can cause sleep apnea. Your airway can become blocked when your throat muscles and tongue relax during sleep. You can help treat sleep apnea at home by making lifestyle changes. You also can use a CPAP breathing machine that keeps tissues in the throat from blocking your airway. Or your doctor may suggest that you use a breathing device while you sleep. It helps keep your airway open. This could be a device that you put in your mouth. In some cases, surgery may be needed to remove enlarged tissues in the throat. Follow-up care is a key part of your treatment and safety. Be sure to make and go to all appointments, and call your doctor if you are having problems. It's also a good idea to know your test results and keep a list of the medicines you take. How can you care for yourself at home? · Lose weight, if needed. · Sleep on your side. It may help mild apnea. · Avoid alcohol and medicines such as sleeping pills, opioids, or sedatives before bed. · Don't smoke. If you need help quitting, talk to your doctor. · Prop up the head of your bed. · Treat breathing problems, such as a stuffy nose, that are caused by a cold or allergies. · Try a continuous positive airway pressure (CPAP) breathing machine if your doctor recommends it. · If CPAP doesn't work for you, ask your doctor if you can try other masks, settings, or breathing machines. · Try oral breathing devices or other nasal devices. · Talk to your doctor if your nose feels dry or bleeds, or if it gets runny or stuffy when you use a breathing machine. · Tell your doctor if you're sleepy during the day and it affects your daily life.  Don't drive or operate machinery when you're drowsy. When should you call for help? Watch closely for changes in your health, and be sure to contact your doctor if:    · You still have sleep apnea even though you have made lifestyle changes.     · You are thinking of trying a device such as CPAP.     · You are having problems using a CPAP or similar machine.     · You are still sleepy during the day, and it affects your daily life. Where can you learn more? Go to https://Hepregen.MissingLINK. org and sign in to your [employer] account. Enter M382 in the Ziippi box to learn more about \"Sleep Apnea: Care Instructions. \"     If you do not have an account, please click on the \"Sign Up Now\" link. Current as of: July 6, 2021               Content Version: 13.1  © 2022-3098 Healthwise, Incorporated. Care instructions adapted under license by [facility] ([facility]). If you have questions about a medical condition or this instruction, always ask your healthcare professional. [place] [address] any warranty or liability for your use of this information.

## 2022-03-01 ENCOUNTER — HOSPITAL ENCOUNTER (OUTPATIENT)
Dept: SLEEP CENTER | Age: 32
Discharge: HOME | End: 2022-03-03
Payer: MEDICARE

## 2022-03-01 DIAGNOSIS — G47.33 OSA (OBSTRUCTIVE SLEEP APNEA): ICD-10-CM

## 2022-03-01 DIAGNOSIS — I10 BENIGN ESSENTIAL HTN: ICD-10-CM

## 2022-03-01 DIAGNOSIS — E66.01 MORBID OBESITY WITH BMI OF 40.0-44.9, ADULT (HCC): ICD-10-CM

## 2022-03-01 PROCEDURE — 95806 SLEEP STUDY UNATT&RESP EFFT: CPT

## 2022-03-01 NOTE — PROGRESS NOTES
Fabienne Ramos presents today for a HST instruction and demonstration on unit # 339. Questions were asked and answers given. She was able to return demonstration and verbalized understanding. The sleep center control room phone number was provided incase questions arise during her study. Informed patient to call 911 in case of an emergency. She states she will return the unit tomorrow by 1030. Covid screen was negative, temperature WNL.

## 2022-03-02 LAB — STATUS: NORMAL

## 2022-03-07 ENCOUNTER — OFFICE VISIT (OUTPATIENT)
Dept: FAMILY MEDICINE CLINIC | Age: 32
End: 2022-03-07
Payer: MEDICARE

## 2022-03-07 VITALS
HEIGHT: 68 IN | RESPIRATION RATE: 14 BRPM | BODY MASS INDEX: 43.86 KG/M2 | WEIGHT: 289.4 LBS | TEMPERATURE: 98.6 F | SYSTOLIC BLOOD PRESSURE: 138 MMHG | OXYGEN SATURATION: 98 % | HEART RATE: 87 BPM | DIASTOLIC BLOOD PRESSURE: 86 MMHG

## 2022-03-07 DIAGNOSIS — R20.2 NUMBNESS AND TINGLING IN RIGHT HAND: ICD-10-CM

## 2022-03-07 DIAGNOSIS — R20.0 NUMBNESS AND TINGLING IN RIGHT HAND: ICD-10-CM

## 2022-03-07 DIAGNOSIS — K21.9 GASTROESOPHAGEAL REFLUX DISEASE, UNSPECIFIED WHETHER ESOPHAGITIS PRESENT: ICD-10-CM

## 2022-03-07 DIAGNOSIS — F41.1 GAD (GENERALIZED ANXIETY DISORDER): Primary | ICD-10-CM

## 2022-03-07 PROCEDURE — G8417 CALC BMI ABV UP PARAM F/U: HCPCS | Performed by: NURSE PRACTITIONER

## 2022-03-07 PROCEDURE — G8427 DOCREV CUR MEDS BY ELIG CLIN: HCPCS | Performed by: NURSE PRACTITIONER

## 2022-03-07 PROCEDURE — 99214 OFFICE O/P EST MOD 30 MIN: CPT | Performed by: NURSE PRACTITIONER

## 2022-03-07 PROCEDURE — G8484 FLU IMMUNIZE NO ADMIN: HCPCS | Performed by: NURSE PRACTITIONER

## 2022-03-07 PROCEDURE — 1036F TOBACCO NON-USER: CPT | Performed by: NURSE PRACTITIONER

## 2022-03-07 RX ORDER — NAPROXEN 375 MG/1
375 TABLET ORAL 2 TIMES DAILY WITH MEALS
Qty: 180 TABLET | Refills: 1 | Status: SHIPPED | OUTPATIENT
Start: 2022-03-07

## 2022-03-07 RX ORDER — BUSPIRONE HYDROCHLORIDE 5 MG/1
5 TABLET ORAL 2 TIMES DAILY
COMMUNITY
End: 2022-03-07 | Stop reason: SDUPTHER

## 2022-03-07 RX ORDER — BUSPIRONE HYDROCHLORIDE 5 MG/1
5 TABLET ORAL 2 TIMES DAILY
Qty: 180 TABLET | Refills: 3 | Status: SHIPPED | OUTPATIENT
Start: 2022-03-07

## 2022-03-07 SDOH — ECONOMIC STABILITY: FOOD INSECURITY: WITHIN THE PAST 12 MONTHS, YOU WORRIED THAT YOUR FOOD WOULD RUN OUT BEFORE YOU GOT MONEY TO BUY MORE.: NEVER TRUE

## 2022-03-07 SDOH — ECONOMIC STABILITY: FOOD INSECURITY: WITHIN THE PAST 12 MONTHS, THE FOOD YOU BOUGHT JUST DIDN'T LAST AND YOU DIDN'T HAVE MONEY TO GET MORE.: NEVER TRUE

## 2022-03-07 ASSESSMENT — SOCIAL DETERMINANTS OF HEALTH (SDOH): HOW HARD IS IT FOR YOU TO PAY FOR THE VERY BASICS LIKE FOOD, HOUSING, MEDICAL CARE, AND HEATING?: NOT HARD AT ALL

## 2022-03-07 NOTE — PROGRESS NOTES
Sakina Bah is a 33 y.o. female that presents for Anxiety (has gotten better since she started taken buspar. ) and Joint Pain (right wrist, will get tingling and numbness in fingers)      Anxiety     HPI:    Inciting events or triggers for anxiety - everyday life events, work stress   Frequency of anxiety - it had been daily but she states since she has been taking the buspar bid she has noticed her anxiety has lessened, no longer having heart palpitations. Panic attacks? No  Symptoms of panic attacks -  palpitations and shortness of breath  Sleep Disturbances? No  Impaired concentration? No  Substance abuse? No  Suicidal/Homicidal Ideation? No    Compliant with meds: yes  Med side effects: No    Sees therapist?: No  Family History of Mental Illness? Unknown, pt states doing well with this dose, denies any depression. Taking prilosec for gerd it is working well for her no c/o. Pt states she has been having right hand N/T on occasion. Does have a job with repetitvve movements, it does not wake her up at night but doing the day she will experience some tingling of her 2nd through 4th fingers. States her right hand grasp is getting weak, has used a brace for this in the past. It did help Jt swelling of her right hand. Physical Exam    /86   Pulse 87   Temp 98.6 °F (37 °C) (Oral)   Resp 14   Ht 5' 8\" (1.727 m)   Wt 289 lb 6.4 oz (131.3 kg)   LMP 03/06/2022 (Exact Date)   SpO2 98%   BMI 44.00 kg/m²   Appearance: alert, well appearing, and in no distress, normal appearing weight and well hydrated. CVS exam: normal rate, regular rhythm, normal S1, S2, no murmurs, rubs, clicks or gallops. Lungs: clear to auscultation, no wheezes, rales or rhonchi, symmetric air entry. Skin exam - normal coloration and turgor, no rashes, no suspicious skin lesions noted. Positive phalens' on right, right hand grasp strong RUE pulses equal and strong , no edema or erythema of right hand.    Psych: Affect appropriate. Thought process is normal without evidence of depression or psychosis. Good insight and appropriae interaction. Cognition and memory appear to be intact. ASSESSMENT & PLAN  Jian Ma was seen today for anxiety and joint pain. Diagnoses and all orders for this visit:    TYRON (generalized anxiety disorder)  -     busPIRone (BUSPAR) 5 MG tablet; Take 1 tablet by mouth 2 times daily  improved  Gastroesophageal reflux disease, unspecified whether esophagitis present  Controlled with current meds  gerd diet   Numbness and tingling in right hand  -     Wrist splint  -     EMG; Future  R/o carpal tunnel syndrome  Nsaids, pt in agreement with plan  Other orders  -     naproxen (NAPROSYN) 375 MG tablet; Take 1 tablet by mouth 2 times daily (with meals)        Return in about 3 months (around 6/7/2022) for f/u wirst splint.

## 2022-03-07 NOTE — PATIENT INSTRUCTIONS
Patient Education        Carpal Tunnel Syndrome: Exercises  Introduction  Here are some examples of exercises for you to try. The exercises may be suggested for a condition or for rehabilitation. Start each exercise slowly. Ease off the exercises if you start to have pain. You will be told when to start these exercises and which ones will work best for you. Warm-up stretches  When you no longer have pain or numbness, you can do exercises to help prevent carpal tunnel syndrome from coming back. Do not do any stretch or movement that is uncomfortable or painful. 1. Rotate your wrist up, down, and from side to side. Repeat 4 times. 2. Stretch your fingers far apart. Relax them, and then stretch them again. Repeat 4 times. 3. Stretch your thumb by pulling it back gently, holding it, and then releasing it. Repeat 4 times. How to do the exercises  Prayer stretch    1. Start with your palms together in front of your chest just below your chin. 2. Slowly lower your hands toward your waistline, keeping your hands close to your stomach and your palms together until you feel a mild to moderate stretch under your forearms. 3. Hold for at least 15 to 30 seconds. Repeat 2 to 4 times. Wrist flexor stretch    1. Extend your arm in front of you with your palm up. 2. Bend your wrist, pointing your hand toward the floor. 3. With your other hand, gently bend your wrist farther until you feel a mild to moderate stretch in your forearm. 4. Hold for at least 15 to 30 seconds. Repeat 2 to 4 times. Wrist extensor stretch    1. Repeat steps 1 through 4 of the stretch above, but begin with your extended hand palm down. Follow-up care is a key part of your treatment and safety. Be sure to make and go to all appointments, and call your doctor if you are having problems. It's also a good idea to know your test results and keep a list of the medicines you take. Where can you learn more?   Go to https://chpepiceweb.[email or web address] org and sign in to your Sentilliont account. Enter Y875 in the Chaordixhire box to learn more about \"Carpal Tunnel Syndrome: Exercises. \"     If you do not have an account, please click on the \"Sign Up Now\" link. Current as of: July 1, 2021               Content Version: 13.1  © 6760-6231 Healthwise, [facility]. Care instructions adapted under license by [facility] ([facility]). If you have questions about a medical condition or this instruction, always ask your healthcare professional. [place] [address] any warranty or liability for your use of this information.

## 2022-03-09 NOTE — PROGRESS NOTES
[address] [place] [address]                               SLEEP STUDY REPORT    PATIENT NAME: Eladia Maria                  :        1990  MED REC NO:   [id number]                           ROOM:  ACCOUNT NO:   [de-identified]                           ADMIT DATE: 2022  PROVIDER:     ROXANA Armenta Ford OF STUDY:  2022    TYPE III POLYSOMNOGRAM    REFERRING PROVIDER:  Linus Cordoba CNP    HISTORY OF PRESENT ILLNESS:  The patient is a 70-year-old female with  complaints of snoring, nonrestorative sleep, daytime somnolence. Her  weight is 285 pounds, height 68 inches, BMI 43.3. METHODS: The patient underwent Type III Portable Monitoring Sleep Study  including the simultaneous recording of oral-nasal airflow, rib and  abdominal respiratory effort, pulse rate, oxygen saturation, and body  position. Scoring criteria is consistent with the current published  AASM standards for scoring of apneas and hypopneas 1B. DETAILS OF THE STUDY:  Total recording time 420 minutes. Lights off  time 10:34 p.m. Lights on time 05:35 a.m. RESPIRATORY SUMMARY:  2 obstructive apneas, 37 obstructive hypopneas. The total ANNIA was 5.6. The supine ANNIA was 21 and nonsupine 18. PULSE OXIMETRY SUMMARY:  Mean oxygen saturation 95.7%, lowest oxygen  saturation 82%. There were 30 seconds of oxygen saturation below 88%. BODY POSITION SUMMARY:  149 minutes or 35.5% total recording time in  supine position. 271 minutes or 64.5% in non-supine position. 1.2  minutes or 0.3% in upright time. ECG SUMMARY:  Normal sinus rhythm. IMPRESSION:  Obstructive sleep apnea with an ANNIA of 5.6. RECOMMENDATIONS:  Appropriate therapies for symptomatic mild obstructive  sleep apnea include weight reduction, positional therapy with lateral  sleep, oral appliances, and PAP devices.   The patient will be followed  up in the sleep clinic to review results and deciding on plan of care.         Shlomo Leger M.D.    D: 03/08/2022 22:07:19       T: 03/09/2022 0:43:04     [email or web address]  Job#: [id number]     Doc#: [id number]    CC:

## 2022-03-14 NOTE — PROGRESS NOTES
[facility] [place] [facility]    Christine Vásquez, 33 y.o.  [id number]      Pt of Dr. Lisha Walker is here for a follow up after HST   Study Results  Initial Study Date -  3/1/22  AHI -  5.6    TotalEvents - 39  (Apneas  2  Hypopneas 37  Central  0)  (Total Sleep Time - 420.9 min)  Time with Sats below 88% - 0.5 min  Neck: 17inches   Mallampati: 3  ESS: 3  SAQLI: 72      Interval History       Christine Vásquez is a 33 y.o. old female who comes in to review the results of her recent sleep study, to answer questions and to explore options for treatment. she continues to have symptoms of snoring, choking, fragmented sleep, difficulty falling and maintaining sleep. Feels tired and sleepy at work- 3 caffeinated beverages and at times a B12 pill. HST done 3/1/22 here today for result review  Worsening of events in the supine position. DETAILS OF THE STUDY:  Total recording time 420 minutes. Lights off  time 10:34 p.m. Lights on time 05:35 a.m.     RESPIRATORY SUMMARY:  2 obstructive apneas, 37 obstructive hypopneas. The total ANNIA was 5.6. The supine ANNIA was 21 and nonsupine 18.     PULSE OXIMETRY SUMMARY:  Mean oxygen saturation 95.7%, lowest oxygen  saturation 82%. There were 30 seconds of oxygen saturation below 88%.     BODY POSITION SUMMARY:  149 minutes or 35.5% total recording time in  supine position. 271 minutes or 64.5% in non-supine position. 1.2  minutes or 0.3% in upright time.     ECG SUMMARY:  Normal sinus rhythm.     IMPRESSION:  Obstructive sleep apnea with an ANNIA of 5.6.     Allergies  No Known Allergies  Meds  Current Outpatient Medications   Medication Sig Dispense Refill    naproxen (NAPROSYN) 375 MG tablet Take 1 tablet by mouth 2 times daily (with meals) 180 tablet 1    busPIRone (BUSPAR) 5 MG tablet Take 1 tablet by mouth 2 times daily 180 tablet 3    omeprazole (PRILOSEC) 40 MG delayed release capsule Take 40 mg by mouth daily       No current facility-administered medications for this visit. ROS  Review of Systems   Constitutional: Negative. Negative for chills and fever. HENT: Negative. Negative for congestion. Eyes: Negative. Respiratory: Negative. Negative for cough, shortness of breath and wheezing. Cardiovascular: Negative. Negative for chest pain and leg swelling. Gastrointestinal: Negative. Endocrine: Negative. Genitourinary: Negative. Musculoskeletal: Negative. Allergic/Immunologic: Negative. Neurological: Negative. Hematological: Negative. Psychiatric/Behavioral: Positive for sleep disturbance. Exam  Vitals -  /84   Pulse 76   Temp 97.6 °F (36.4 °C)   Ht 5' 8\" (1.727 m)   Wt 287 lb (130.2 kg)   LMP 03/06/2022 (Exact Date)   SpO2 98% Comment: r/a  BMI 43.64 kg/m²    Body mass index is 43.64 kg/m². Oxygen level - Room Air  Physical Exam  Vitals and nursing note reviewed. Constitutional:       Appearance: She is morbidly obese. HENT:      Head: Normocephalic and atraumatic. Eyes:      Conjunctiva/sclera: Conjunctivae normal.      Pupils: Pupils are equal, round, and reactive to light. Neck:      Vascular: No JVD. Cardiovascular:      Rate and Rhythm: Normal rate and regular rhythm. Heart sounds: Normal heart sounds. No murmur heard. No friction rub. No gallop. Pulmonary:      Effort: Pulmonary effort is normal. No respiratory distress. Breath sounds: Normal breath sounds. No wheezing or rales. Abdominal:      General: Bowel sounds are normal.      Palpations: Abdomen is soft. Musculoskeletal:         General: Normal range of motion. Cervical back: Normal range of motion and neck supple. Skin:     General: Skin is warm and dry. Capillary Refill: Capillary refill takes less than 2 seconds. Neurological:      Mental Status: She is alert and oriented to person, place, and time.    Psychiatric:         Behavior: Behavior normal.         Thought Content: Thought content normal.         Judgment: Judgment normal.        Assessment   Diagnosis Orders   1. NORMA (obstructive sleep apnea)     2. Morbid obesity with BMI of 40.0-44.9, adult ([address]     3. Loud snoring        Recommendations    Will schedule ECHO to be done ASAP to verify EF is > 45%  Then will start APAP therapy 5-20 cm H2O  Will schedule follow up 8-10 weeks after starting therapy  - Download reviewed and discussed with patient  - She  was advised to continue current positive airway pressure therapy with above described pressure. - She  advised to keep good compliance with current recommended pressure to get optimal results and clinical improvement  - Recommend 7-9 hours of sleep with PAP  - She was advised to call [employer] regarding supplies if needed.   -She call my office for earlier appointment if needed for worsening of sleep symptoms.   - She was instructed on weight loss  - Brenda Newsome was educated about my impression and plan. Patient verbalizesunderstanding.   We will see Santiago Owen back in: 3 months with download    Information added by my medical assistant/LPN was reviewed today    Electronically signed by DIANNA Moctezuma CNP on 3/15/2022 at 10:21 AM

## 2022-03-15 ENCOUNTER — OFFICE VISIT (OUTPATIENT)
Dept: PULMONOLOGY | Age: 32
End: 2022-03-15
Payer: MEDICARE

## 2022-03-15 VITALS
HEART RATE: 76 BPM | OXYGEN SATURATION: 98 % | BODY MASS INDEX: 43.5 KG/M2 | WEIGHT: 287 LBS | SYSTOLIC BLOOD PRESSURE: 132 MMHG | HEIGHT: 68 IN | DIASTOLIC BLOOD PRESSURE: 84 MMHG | TEMPERATURE: 97.6 F

## 2022-03-15 DIAGNOSIS — E66.01 MORBID OBESITY WITH BMI OF 40.0-44.9, ADULT (HCC): ICD-10-CM

## 2022-03-15 DIAGNOSIS — R06.83 LOUD SNORING: ICD-10-CM

## 2022-03-15 DIAGNOSIS — G47.33 OSA (OBSTRUCTIVE SLEEP APNEA): Primary | ICD-10-CM

## 2022-03-15 PROCEDURE — G8427 DOCREV CUR MEDS BY ELIG CLIN: HCPCS | Performed by: NURSE PRACTITIONER

## 2022-03-15 PROCEDURE — 99213 OFFICE O/P EST LOW 20 MIN: CPT | Performed by: NURSE PRACTITIONER

## 2022-03-15 PROCEDURE — G8417 CALC BMI ABV UP PARAM F/U: HCPCS | Performed by: NURSE PRACTITIONER

## 2022-03-15 PROCEDURE — G8484 FLU IMMUNIZE NO ADMIN: HCPCS | Performed by: NURSE PRACTITIONER

## 2022-03-15 PROCEDURE — 1036F TOBACCO NON-USER: CPT | Performed by: NURSE PRACTITIONER

## 2022-03-15 ASSESSMENT — ENCOUNTER SYMPTOMS
RESPIRATORY NEGATIVE: 1
ALLERGIC/IMMUNOLOGIC NEGATIVE: 1
WHEEZING: 0
GASTROINTESTINAL NEGATIVE: 1
EYES NEGATIVE: 1
COUGH: 0
SHORTNESS OF BREATH: 0

## 2022-03-22 ENCOUNTER — HOSPITAL ENCOUNTER (OUTPATIENT)
Dept: NON INVASIVE DIAGNOSTICS | Age: 32
Discharge: HOME | End: 2022-03-22
Payer: MEDICARE

## 2022-03-22 ENCOUNTER — TELEPHONE (OUTPATIENT)
Dept: FAMILY MEDICINE CLINIC | Age: 32
End: 2022-03-22

## 2022-03-22 DIAGNOSIS — R00.2 HEART PALPITATIONS: ICD-10-CM

## 2022-03-22 DIAGNOSIS — I10 ESSENTIAL HYPERTENSION: ICD-10-CM

## 2022-03-22 LAB
LV EF: 58 %
LVEF MODALITY: NORMAL

## 2022-03-22 PROCEDURE — 93306 TTE W/DOPPLER COMPLETE: CPT

## 2022-03-22 NOTE — TELEPHONE ENCOUNTER
----- Message from DIANNA Jones CNP sent at 3/22/2022  3:05 PM EDT -----  Let pt know her echo results are normal. Her heart valves are all working appropriately. Let me know if she has any questions.

## 2022-03-23 ENCOUNTER — PATIENT MESSAGE (OUTPATIENT)
Dept: FAMILY MEDICINE CLINIC | Age: 32
End: 2022-03-23

## 2022-03-23 ENCOUNTER — OFFICE VISIT (OUTPATIENT)
Dept: FAMILY MEDICINE CLINIC | Age: 32
End: 2022-03-23
Payer: MEDICARE

## 2022-03-23 VITALS
DIASTOLIC BLOOD PRESSURE: 90 MMHG | OXYGEN SATURATION: 96 % | SYSTOLIC BLOOD PRESSURE: 142 MMHG | RESPIRATION RATE: 14 BRPM | TEMPERATURE: 97.9 F | HEART RATE: 76 BPM | WEIGHT: 289.6 LBS | HEIGHT: 68 IN | BODY MASS INDEX: 43.89 KG/M2

## 2022-03-23 DIAGNOSIS — R00.2 HEART PALPITATIONS: Primary | ICD-10-CM

## 2022-03-23 DIAGNOSIS — I10 PRIMARY HYPERTENSION: ICD-10-CM

## 2022-03-23 LAB
BILIRUBIN, POC: NEGATIVE
BLOOD URINE, POC: NORMAL
CLARITY, POC: CLEAR
COLOR, POC: YELLOW
GLUCOSE URINE, POC: NEGATIVE
KETONES, POC: NEGATIVE
LEUKOCYTE EST, POC: NEGATIVE
NITRITE, POC: NEGATIVE
PH, POC: 6.5
PROTEIN, POC: NEGATIVE
SPECIFIC GRAVITY, POC: 1.01
UROBILINOGEN, POC: 0.2

## 2022-03-23 PROCEDURE — G8484 FLU IMMUNIZE NO ADMIN: HCPCS | Performed by: NURSE PRACTITIONER

## 2022-03-23 PROCEDURE — G8427 DOCREV CUR MEDS BY ELIG CLIN: HCPCS | Performed by: NURSE PRACTITIONER

## 2022-03-23 PROCEDURE — 1036F TOBACCO NON-USER: CPT | Performed by: NURSE PRACTITIONER

## 2022-03-23 PROCEDURE — 99213 OFFICE O/P EST LOW 20 MIN: CPT | Performed by: NURSE PRACTITIONER

## 2022-03-23 PROCEDURE — G8417 CALC BMI ABV UP PARAM F/U: HCPCS | Performed by: NURSE PRACTITIONER

## 2022-03-23 PROCEDURE — 93000 ELECTROCARDIOGRAM COMPLETE: CPT | Performed by: NURSE PRACTITIONER

## 2022-03-23 PROCEDURE — 81003 URINALYSIS AUTO W/O SCOPE: CPT | Performed by: NURSE PRACTITIONER

## 2022-03-23 RX ORDER — LISINOPRIL 5 MG/1
5 TABLET ORAL DAILY
Qty: 90 TABLET | Refills: 1 | Status: SHIPPED | OUTPATIENT
Start: 2022-03-23 | End: 2022-04-05 | Stop reason: SDUPTHER

## 2022-03-23 ASSESSMENT — ENCOUNTER SYMPTOMS: RESPIRATORY NEGATIVE: 1

## 2022-03-23 NOTE — PROGRESS NOTES
[address] [facility] [address]  Dept: [phone]  Dept Fax: [phone]  Loc: Thomas Umaña is a 33 y.o. femalewho presents today for her medical conditions/complaints as noted below. Dulce Ditch c/o of Palpitations (the last couple weeks having them randomly. had them during the echo)      HPI:      Pt presents with some heart fluttering in her chest. She also checked her BP and it had been elevated. Her father has HTN and other heart issues. Pt had a recent echo that was normal and saw Cardiology in December and a cardiac event monitor was ordered for heart palpitations but pt has not yet completed. ECHO Complete 2D W Doppler W Color  Transthoracic Echocardiography Report (TTE)     Demographics      Patient Name   Yoana Zendejas  Gender              Female                  N      MR #           [id number]        Race                                                      Ethnicity      Account #      [de-identified]        Room Number      Accession      [id number]       Date of Study       03/22/2022   Number      Date of Birth  1990       Referring Physician ANAHI Garcia      Age            33 year(s)       Sonographer         Marisol Kaminski RDCS,                                                       RVT                                      Interpreting        Kenna Henson MD                                   Physician     Procedure    Type of Study      TTE procedure:ECHOCARDIOGRAM COMPLETE 2D W DOPPLER W COLOR. Procedure Date  Date: 03/22/2022 Start: 09:27 AM    Study Location: Echo Lab  Technical Quality: Limited visualization due to body habitus. Indications:Hypertension and Palpitations. Additional Medical History:GERD, Former smoker.     Patient Status: Routine    Height: 68 inches Weight: 287.01 pounds BSA: 2.38 m^2 BMI: 43.64 kg/m^2    BP: 132/84 mmHg     Conclusions Summary   Left ventricular size and systolic function is normal. Ejection fraction   was estimated at 55-60%. LV wall thickness is within normal limits and   there are no obvious wall motion abnormalities. The right ventricular size appears normal with normal systolic function   and wall thickness. Signature      ----------------------------------------------------------------   Electronically signed by Emily Copeland MD (Interpreting   physician) on 03/22/2022 at 01:56 PM   ----------------------------------------------------------------      Findings      Mitral Valve   The mitral valve structure is normal with normal leaflet separation. DOPPLER: The transmitral velocity was within the normal range with no   evidence for mitral stenosis. There was no evidence of mitral   regurgitation. Aortic Valve   The aortic valve appears trileaflet with normal thickness and leaflet   excursion. DOPPLER: Transaortic velocity was within the normal range with   no evidence of aortic stenosis. There was no evidence of aortic   regurgitation. Tricuspid Valve   The tricuspid valve structure is normal with normal leaflet separation. DOPPLER: There is no evidence of tricuspid stenosis. There was no evidence   of tricuspid regurgitation. Pulmonic Valve   The pulmonic valve leaflets appear normal thickness, and normal cuspal   separation. DOPPLER: The transpulmonic velocity was within the normal   range. No evidence for regurgitation. Left Atrium   Left atrial size is normal.      Left Ventricle   Left ventricular size and systolic function is normal. Ejection fraction   was estimated at 55-60%. LV wall thickness is within normal limits and   there are no obvious wall motion abnormalities. Right Atrium   Right atrial size was normal.      Right Ventricle   The right ventricular size appears normal with normal systolic function   and wall thickness.       Pericardial Effusion   The pericardium appears normal with no evidence of a pericardial effusion. Pleural Effusion   No evidence of pleural effusion. Aorta / Great Vessels   -Aortic root dimension within normal limits. -IVC size is within normal limits with normal respiratory phasic changes.      M-Mode/2D Measurements & Calculations      LV Diastolic    LV Systolic Dimension: 3  AV Cusp Separation: 2.3 cmLA   Dimension: 4.4  cm                        Dimension: 3.7 cmAO Root   cm              LV Volume Diastolic: 03.3 Dimension: 3 cmLA Area: 15.9   LV FS:31.8 %    ml                        cm^2   LV PW           LV Volume Systolic: 35 ml   Diastolic: 0.9  LV EDV/LV EDV Index: 87.7   cm              ml/37 m^2LV ESV/LV ESV   Septum          Index: 35 ml/15 m^2       RV Diastolic Dimension: 3.6 cm   Diastolic: 0.9  EF Calculated: 60.1 %   cm                                        LA/Aorta: 1.23                                             Ascending Aorta: 2.7 cm                                             LA volume/Index: 39.8 ml /17m^2     Doppler Measurements & Calculations      MV Peak E-Wave: 71.1 cm/s  AV Peak Velocity: 124  LVOT Peak Velocity: 91.3   MV Peak A-Wave: 61.7 cm/s  cm/s                   cm/s   MV E/A Ratio: 1.15         AV Peak Gradient: 6.15 LVOT Peak Gradient: 3   MV Peak Gradient: 2.02     mmHg                   mmHg   mmHg                                                     TV Peak E-Wave: 53.6   MV Deceleration Time: 235                         cm/s   msec                                              TV Peak A-Wave: 40.3   MV P1/2t: 69 msec          IVRT: 71 msec          cm/s   MVA by PHT:3.19 cm^2                                                     TV Peak Gradient: 1.15   MV E' Septal Velocity: 9.5 AV DVI (Vmax):0.74     mmHg   cm/s   MV A' Septal Velocity:                            PV Peak Velocity: 92.1   10.9 cm/s                                         cm/s   MV E' Lateral Velocity:                           PV Peak Gradient: 3.39   12.7 cm/s                                         mmHg   MV A' Lateral Velocity:   14.4 cm/s   E/E' septal: 7.48   E/E' lateral: 5.6     Recent EKG reviewed today and is normal, pt states she has been fatigued lately, she does have episodes of heart pounding which does cause some chest pressure. She denies sob does not smoke. Denies H/A or vision changes. Current Outpatient Medications   Medication Sig Dispense Refill    lisinopril (PRINIVIL;ZESTRIL) 5 MG tablet Take 1 tablet by mouth daily 90 tablet 1    naproxen (NAPROSYN) 375 MG tablet Take 1 tablet by mouth 2 times daily (with meals) 180 tablet 1    busPIRone (BUSPAR) 5 MG tablet Take 1 tablet by mouth 2 times daily 180 tablet 3    omeprazole (PRILOSEC) 40 MG delayed release capsule Take 40 mg by mouth daily       No current facility-administered medications for this visit.           Past Medical History:   Diagnosis Date    Biliary dyskinesia     GERD (gastroesophageal reflux disease)     PONV (postoperative nausea and vomiting)     Seasonal allergies       Past Surgical History:   Procedure Laterality Date     SECTION  10/15/2010     SECTION  2019    CHOLECYSTECTOMY, LAPAROSCOPIC N/A 6/3/2021    CHOLECYSTECTOMY LAPAROSCOPIC ROBOTIC performed by Antoni Lopez MD at [address] [place] [address]  2019    UPPER GASTROINTESTINAL ENDOSCOPY  2020    Galen Bajwa     Family History   Problem Relation Age of Onset    Heart Disease Father     Stroke Father     High Blood Pressure Father     High Cholesterol Father     Kidney Disease Father     Other Father         blood disorder MTHFR    No Known Problems Mother     No Known Problems Brother     No Known Problems Maternal Grandmother     No Known Problems Maternal Grandfather     No Known Problems Paternal Grandmother     No Known Problems Brother      Social History     Tobacco Use    Smoking status: Former Smoker     Packs/day: 0.25     Types: Cigarettes     Quit date: 9/1/2011     Years since quitting: 10.5    Smokeless tobacco: Never Used    Tobacco comment: 1 pack every 2 days for 3 yrs   Substance Use Topics    Alcohol use: No        No Known Allergies    Health Maintenance   Topic Date Due    Cervical cancer screen  04/10/2021    Flu vaccine (1) 09/01/2021    COVID-19 Vaccine (3 - Booster for Pfizer series) 12/16/2021    Depression Screen  09/20/2022    DTaP/Tdap/Td vaccine (8 - Td or Tdap) 05/28/2029    Hepatitis B vaccine  Completed    Hib vaccine  Completed    Hepatitis C screen  Completed    HIV screen  Completed    Hepatitis A vaccine  Aged Out    Meningococcal (ACWY) vaccine  Aged Out    Pneumococcal 0-64 years Vaccine  Aged Out    Varicella vaccine  Discontinued       Subjective:      Review of Systems   Respiratory: Negative. Cardiovascular: Negative. Skin: Negative. Psychiatric/Behavioral: Negative for self-injury, sleep disturbance and suicidal ideas. The patient is nervous/anxious (does take buspar for this, which did help her heart palpitations in the beginning. ). Objective:      BP (!) 142/90   Pulse 76   Temp 97.9 °F (36.6 °C) (Oral)   Resp 14   Ht 5' 8\" (1.727 m)   Wt 289 lb 9.6 oz (131.4 kg)   LMP 03/06/2022 (Exact Date)   SpO2 96%   BMI 44.03 kg/m²      Physical Exam  Vitals and nursing note reviewed. Constitutional:       Appearance: She is not ill-appearing. Eyes:      General: Lids are normal.      Conjunctiva/sclera: Conjunctivae normal.   Cardiovascular:      Rate and Rhythm: Normal rate and regular rhythm. Pulses: Normal pulses. Heart sounds: Normal heart sounds. No murmur heard. Pulmonary:      Effort: Pulmonary effort is normal. No respiratory distress. Breath sounds: Normal breath sounds. Skin:     General: Skin is warm and dry. Capillary Refill: Capillary refill takes less than 2 seconds.    Neurological:      General: No focal deficit present. Mental Status: She is alert and oriented to person, place, and time. Assessment/Plan:           1. Heart palpitations  Lisinopril ordered  - EKG 12 Lead-NSR  Recommend pt reschedule cardiac event monitor   2. Primary hypertension  As above  - POCT Urinalysis No Micro (Auto)-normal    3. BMI 40.0-44.9, adult (HCC)  Increase physical activity decrease caloric intake and loose weight to decrease BP  Decrease salt intake. Return in about 2 weeks (around 4/6/2022) for NV for BP check . Reccommended tobaccocessation options including pharmacologic methods, counseled great than 3 minutesduring this visit:  Yes[]  No  []       Patient given educational materials -see patient instructions. Discussed use, benefit, and side effects of prescribedmedications. All patient questions answered. Pt voiced understanding. Reviewedhealth maintenance. Instructed to continue current medications, diet and exercise. Patient agreed with treatment plan. Follow up as directed.        Electronicallysigned by DIANNA Iqbal CNP on 3/23/2022 at 10:25 AM

## 2022-03-23 NOTE — TELEPHONE ENCOUNTER
Called ptjessenia to see if she would like to come for an appointment today. She is going to check with her boss and send a mychart msg back regarding the appt. She voiced understanding.

## 2022-04-05 ENCOUNTER — NURSE ONLY (OUTPATIENT)
Dept: FAMILY MEDICINE CLINIC | Age: 32
End: 2022-04-05

## 2022-04-05 ENCOUNTER — TELEPHONE (OUTPATIENT)
Dept: FAMILY MEDICINE CLINIC | Age: 32
End: 2022-04-05

## 2022-04-05 VITALS
RESPIRATION RATE: 16 BRPM | HEART RATE: 84 BPM | OXYGEN SATURATION: 98 % | DIASTOLIC BLOOD PRESSURE: 82 MMHG | SYSTOLIC BLOOD PRESSURE: 138 MMHG

## 2022-04-05 DIAGNOSIS — I10 PRIMARY HYPERTENSION: Primary | ICD-10-CM

## 2022-04-05 PROBLEM — D62 POSTOPERATIVE ANEMIA DUE TO ACUTE BLOOD LOSS: Status: ACTIVE | Noted: 2022-04-05

## 2022-04-05 PROBLEM — E66.01 SEVERE OBESITY (HCC): Status: ACTIVE | Noted: 2022-04-05

## 2022-04-05 PROBLEM — J02.9 ACUTE PHARYNGITIS: Status: ACTIVE | Noted: 2018-12-21

## 2022-04-05 RX ORDER — LISINOPRIL 10 MG/1
10 TABLET ORAL DAILY
Qty: 90 TABLET | Refills: 3 | Status: SHIPPED | OUTPATIENT
Start: 2022-04-05

## 2022-04-05 NOTE — TELEPHONE ENCOUNTER
Pt informed and verbalized understanding.   Future Appointments   Date Time Provider Noris Pickering   4/12/2022  9:30 AM DIANNA Canchola   4/25/2022  9:20 AM SCHEDULE, [address]

## 2022-04-05 NOTE — PROGRESS NOTES
Pt came in for Bp check. Patient states she has been taking her medication daily in the morning. Patient states she feels like it is working. Bp was high last appointment.         BP Readings from Last 3 Encounters:   04/05/22 138/82   03/23/22 (!) 142/90   03/15/22 132/84

## 2022-04-05 NOTE — TELEPHONE ENCOUNTER
Let pt know her bp is better but normal range is 120/80 which she is still above that level. I will increase her lisinopril to 10 mg daily, new orders sent. Let me know if she has any questions.   She can come back in 2-3 weeks for another nurse visit for bp check thanks

## 2022-04-08 ENCOUNTER — TELEPHONE (OUTPATIENT)
Dept: FAMILY MEDICINE CLINIC | Age: 32
End: 2022-04-08

## 2022-04-11 ENCOUNTER — OFFICE VISIT (OUTPATIENT)
Dept: PULMONOLOGY | Age: 32
End: 2022-04-11
Payer: MEDICARE

## 2022-04-11 VITALS
OXYGEN SATURATION: 98 % | SYSTOLIC BLOOD PRESSURE: 138 MMHG | BODY MASS INDEX: 43.19 KG/M2 | DIASTOLIC BLOOD PRESSURE: 82 MMHG | TEMPERATURE: 98 F | HEART RATE: 88 BPM | HEIGHT: 68 IN | WEIGHT: 285 LBS

## 2022-04-11 DIAGNOSIS — G47.33 OSA (OBSTRUCTIVE SLEEP APNEA): Primary | ICD-10-CM

## 2022-04-11 DIAGNOSIS — I10 BENIGN ESSENTIAL HTN: ICD-10-CM

## 2022-04-11 DIAGNOSIS — E66.01 MORBID OBESITY WITH BMI OF 40.0-44.9, ADULT (HCC): ICD-10-CM

## 2022-04-11 DIAGNOSIS — R06.83 LOUD SNORING: ICD-10-CM

## 2022-04-11 PROCEDURE — G8417 CALC BMI ABV UP PARAM F/U: HCPCS | Performed by: NURSE PRACTITIONER

## 2022-04-11 PROCEDURE — 99214 OFFICE O/P EST MOD 30 MIN: CPT | Performed by: NURSE PRACTITIONER

## 2022-04-11 PROCEDURE — G8427 DOCREV CUR MEDS BY ELIG CLIN: HCPCS | Performed by: NURSE PRACTITIONER

## 2022-04-11 PROCEDURE — 1036F TOBACCO NON-USER: CPT | Performed by: NURSE PRACTITIONER

## 2022-04-11 ASSESSMENT — ENCOUNTER SYMPTOMS
SHORTNESS OF BREATH: 1
EYES NEGATIVE: 1
COUGH: 0
ALLERGIC/IMMUNOLOGIC NEGATIVE: 1
WHEEZING: 0
GASTROINTESTINAL NEGATIVE: 1

## 2022-04-11 NOTE — PROGRESS NOTES
[facility]    Patient: Fela Wade, 33 y.o.   : 1990    Pt of Dr. Bhavani Ruiz   Patient presents with    Follow-up     1 month follow up with Echo 3/22/22        HPI  Jagjit is here for follow up for ECHO results and to start APAP therapy     Recently started on BP medication by PCP- Lisinopril 10 mg PO daily - no dry cough  ECHO done WNL  Will start patient on APAP therapy today    Still with snoring at night and feeling sleepy during the day   No other new medical issues since last visit     Progress History:   Since last visit any new medical issues? Yes HTN  New ER or hospital visits? No  Any new or changes in medicines? No  Using inhalers? No  Are they helpful? No  Flu vaccine? Pneumonia vaccine?   Past Medical hx   PMH:  Past Medical History:   Diagnosis Date    Biliary dyskinesia     GERD (gastroesophageal reflux disease)     PONV (postoperative nausea and vomiting)     Seasonal allergies      SURGICAL HISTORY:  Past Surgical History:   Procedure Laterality Date     SECTION  10/15/2010     SECTION  2019    CHOLECYSTECTOMY, LAPAROSCOPIC N/A 6/3/2021    CHOLECYSTECTOMY LAPAROSCOPIC ROBOTIC performed by Phillip Patton MD at [address] [place] [address]  2019    UPPER GASTROINTESTINAL ENDOSCOPY  2020    Nate     SOCIAL HISTORY:  Social History     Tobacco Use    Smoking status: Former Smoker     Packs/day: 0.25     Types: Cigarettes     Quit date: 2011     Years since quitting: 10.6    Smokeless tobacco: Never Used    Tobacco comment: 1 pack every 2 days for 3 yrs   Vaping Use    Vaping Use: Former   Substance Use Topics    Alcohol use: No    Drug use: No     ALLERGIES:No Known Allergies  FAMILY HISTORY:  Family History   Problem Relation Age of Onset    Heart Disease Father     Stroke Father     High Blood Pressure Father     High Cholesterol Father     Kidney Disease Father     Other Father         blood disorder MTHFR    No Known Problems Mother     No Known Problems Brother     No Known Problems Maternal Grandmother     No Known Problems Maternal Grandfather     No Known Problems Paternal Grandmother     No Known Problems Brother      CURRENT MEDICATIONS:  Current Outpatient Medications   Medication Sig Dispense Refill    lisinopril (PRINIVIL;ZESTRIL) 10 MG tablet Take 1 tablet by mouth daily 90 tablet 3    naproxen (NAPROSYN) 375 MG tablet Take 1 tablet by mouth 2 times daily (with meals) 180 tablet 1    busPIRone (BUSPAR) 5 MG tablet Take 1 tablet by mouth 2 times daily 180 tablet 3    omeprazole (PRILOSEC) 40 MG delayed release capsule Take 40 mg by mouth daily       No current facility-administered medications for this visit. ROS   Review of Systems   Constitutional: Negative. Negative for chills and fever. HENT: Negative. Negative for congestion. Eyes: Negative. Respiratory: Positive for shortness of breath (mainly with exertion only ). Negative for cough and wheezing. Cardiovascular: Negative. Negative for chest pain and leg swelling. Gastrointestinal: Negative. Endocrine: Negative. Genitourinary: Negative. Musculoskeletal: Negative. Allergic/Immunologic: Negative. Neurological: Negative. Hematological: Negative. Psychiatric/Behavioral: Positive for sleep disturbance. Physical exam   /82   Pulse 88   Temp 98 °F (36.7 °C)   Ht 5' 8\" (1.727 m)   Wt 285 lb (129.3 kg)   SpO2 98% Comment: r/a  BMI 43.33 kg/m²    Wt Readings from Last 3 Encounters:   04/11/22 285 lb (129.3 kg)   03/23/22 289 lb 9.6 oz (131.4 kg)   03/15/22 287 lb (130.2 kg)       Physical Exam  Vitals and nursing note reviewed. Constitutional:       Appearance: She is well-developed. She is obese. HENT:      Head: Normocephalic and atraumatic.    Eyes:      Conjunctiva/sclera: Conjunctivae normal.      Pupils: Pupils are equal, round, and reactive to light. Neck:      Vascular: No JVD. Cardiovascular:      Rate and Rhythm: Normal rate and regular rhythm. Heart sounds: Normal heart sounds. No murmur heard. No friction rub. No gallop. Pulmonary:      Effort: Pulmonary effort is normal. No respiratory distress. Breath sounds: Normal breath sounds. No wheezing or rales. Abdominal:      General: Bowel sounds are normal.      Palpations: Abdomen is soft. Musculoskeletal:         General: Normal range of motion. Cervical back: Normal range of motion and neck supple. Skin:     General: Skin is warm and dry. Capillary Refill: Capillary refill takes less than 2 seconds. Neurological:      Mental Status: She is alert and oriented to person, place, and time. Psychiatric:         Behavior: Behavior normal.         Thought Content: Thought content normal.         Judgment: Judgment normal.          results   Lung Nodule Screening     [] Qualifies    [x] Does not qualify   [] Declined    [] Completed  The USPSTF recommends annual screening for lung cancer with low-dose computed tomography (LDCT) in adults aged 48 to [de-identified] years who have a 30 pack-year smoking history and currently smoke or have quit within the past 20 years. Screening should be discontinued once a person has not smoked for 20 years or develops a health problem that substantially limits life expectancy or the ability or willingness to have curative lung surgery. 03/22/2022   ECHOCARDIOGRAM COMPLETE 2D W DOPPLER W COLOR    Conclusions      Summary   Left ventricular size and systolic function is normal. Ejection fraction   was estimated at 55-60%. LV wall thickness is within normal limits and   there are no obvious wall motion abnormalities. The right ventricular size appears normal with normal systolic function   and wall thickness.       Signature      ----------------------------------------------------------------   Electronically signed by Lashell Knowles MD (Interpreting    DETAILS OF THE STUDY:  Total recording time 420 minutes.  Lights off  time 10:34 p.m.  Lights on time 05:35 a.m.     RESPIRATORY SUMMARY:  2 obstructive apneas, 37 obstructive hypopneas. The total ANNIA was 5.6.  The supine ANNIA was 21 and nonsupine 18.     PULSE OXIMETRY SUMMARY:  Mean oxygen saturation 95.7%, lowest oxygen  saturation 82%.  There were 30 seconds of oxygen saturation below 88%.     BODY POSITION SUMMARY:  149 minutes or 35.5% total recording time in  supine position.  271 minutes or 64.5% in non-supine position.  1.2  minutes or 0.3% in upright time.     ECG SUMMARY:  Normal sinus rhythm.     IMPRESSION:  Obstructive sleep apnea with an ANNIA of 5.6.     Assessment      Diagnosis Orders   1. NORMA (obstructive sleep apnea)  DME Order for CPAP as OP   2. Loud snoring     3. Morbid obesity with BMI of 40.0-44.9, adult ([address]     4. Benign essential HTN           Plan   - RX done for APAP therapy 5-20 cm H2O with DL in 8-10 weeks (3 months) after starting   - Reviewed current medication list  - ECHO results reviewed with patient   - She  advised to keep good compliance with current recommended pressure to get optimal results and clinical improvement  - Recommend 7-9 hours of sleep with PAP  - She was advised to call DME company regarding supplies if needed.   -She call my office for earlier appointment if needed for worsening of sleep symptoms.   - She was instructed on weight loss  - Therese Sanford was educated about my impression and plan. Patient verbalizesunderstanding.   We will see Chapincito Roa back in: 3 months with download    Information added by my medical assistant/LPN was reviewed today    Will see Chapincito Roa back in: 3 months    Dot Montes, [facility]  4/11/2022

## 2022-05-25 RX ORDER — AMOXICILLIN 500 MG/1
500 CAPSULE ORAL 3 TIMES DAILY
Qty: 30 CAPSULE | Refills: 0 | Status: SHIPPED | OUTPATIENT
Start: 2022-05-25 | End: 2022-06-04

## 2022-08-29 ENCOUNTER — OFFICE VISIT (OUTPATIENT)
Dept: PULMONOLOGY | Age: 32
End: 2022-08-29
Payer: MEDICARE

## 2022-08-29 VITALS
SYSTOLIC BLOOD PRESSURE: 130 MMHG | DIASTOLIC BLOOD PRESSURE: 82 MMHG | HEIGHT: 68 IN | BODY MASS INDEX: 40.35 KG/M2 | TEMPERATURE: 97.3 F | HEART RATE: 65 BPM | WEIGHT: 266.2 LBS | OXYGEN SATURATION: 97 %

## 2022-08-29 DIAGNOSIS — Z99.89 OSA ON CPAP: Primary | ICD-10-CM

## 2022-08-29 DIAGNOSIS — E66.01 MORBID OBESITY WITH BMI OF 40.0-44.9, ADULT (HCC): ICD-10-CM

## 2022-08-29 DIAGNOSIS — G47.33 OSA ON CPAP: Primary | ICD-10-CM

## 2022-08-29 PROCEDURE — 99214 OFFICE O/P EST MOD 30 MIN: CPT | Performed by: NURSE PRACTITIONER

## 2022-08-29 PROCEDURE — G8427 DOCREV CUR MEDS BY ELIG CLIN: HCPCS | Performed by: NURSE PRACTITIONER

## 2022-08-29 PROCEDURE — 1036F TOBACCO NON-USER: CPT | Performed by: NURSE PRACTITIONER

## 2022-08-29 PROCEDURE — G8417 CALC BMI ABV UP PARAM F/U: HCPCS | Performed by: NURSE PRACTITIONER

## 2022-08-29 ASSESSMENT — ENCOUNTER SYMPTOMS
WHEEZING: 0
GASTROINTESTINAL NEGATIVE: 1
COUGH: 0
SHORTNESS OF BREATH: 0
RESPIRATORY NEGATIVE: 1
EYES NEGATIVE: 1
ALLERGIC/IMMUNOLOGIC NEGATIVE: 1

## 2022-08-29 NOTE — PROGRESS NOTES
Patient reports PAP pressure is: 5-20    Patient reports no air leaks.   MASK: Nasal    Patient is falling asleep with difficulty: no    Patient is having difficulty remaining asleep: no    Patient needs supplies: No    Patient is having daytime sleepiness: no    Patient is snoring: Yes    Other: no concerns  doesn't keep machine on tends to take it off when she sleeps

## 2022-08-29 NOTE — PROGRESS NOTES
[facility]      Rui Hill         [id number]  8/29/2022   Chief Complaint   Patient presents with    Follow-up     NORMA f/u after being set up 6/20/22        Pt of Dr. Isabel SOTELO Download:   Cherry Loving or initial AHI: 5.6     Date of initial study: 3/1/22      Compliant  0%     Noncompliant 27 %     PAP Type Airsense 11 autoset Level  5-20   Avg Hrs/Day 1 hrs 38 mins   AHI: 1.7   Recorded compliance dates , 7/29/22-8/27/22  Machine/Mfg:   [x] ResMed    [] Respironics/Dreamstation   Interface:   [] Nasal    [] Nasal pillows   [x] FFM      Provider:      [x] -HMSEFERINO     []Lianna     [] Marcos    [] Alberta Nolasco    [] [place]               [] [facility]      [] [employer]ptive    [] [address]      [] Other    Neck Size: 17  Mallampati Mallampati 3  ESS:  4  SAQLI: 88    Here is a scan of the most recent download:                Presentation:   Natividad Valverde presents for sleep medicine follow up for obstructive sleep apnea  Patient reports PAP pressure is: 5-20     Patient reports no air leaks. MASK: Nasal     Patient is falling asleep with difficulty: no     Patient is having difficulty remaining asleep: no     Patient needs supplies: No     Patient is having daytime sleepiness: no     Patient is snoring: Yes     Other: no concerns  doesn't keep machine on tends to take it off when she sleeps     Equipment issues: The pressure is  acceptable, the mask is acceptable     Sleep issues:  Do you feel better? Yes  More rested? Yes   Better concentration? yes    Progress History:   Since last visit any new medical issues? No  New ER or hospital visits? No  Any new or changes in medicines? No  Any new sleep medicines? No    Review of Systems -   Review of Systems   Constitutional: Negative. Negative for chills and fever. HENT: Negative. Negative for congestion. Eyes: Negative. Respiratory: Negative. Negative for cough, shortness of breath and wheezing.     Cardiovascular: Negative. Negative for chest pain and leg swelling. Gastrointestinal: Negative. Endocrine: Negative. Genitourinary: Negative. Musculoskeletal: Negative. Allergic/Immunologic: Negative. Neurological: Negative. Hematological: Negative. Psychiatric/Behavioral: Negative. Negative for sleep disturbance. Physical Exam:    BMI:  Body mass index is 40.48 kg/m². Wt Readings from Last 3 Encounters:   08/29/22 266 lb 3.2 oz (120.7 kg)   04/11/22 285 lb (129.3 kg)   03/23/22 289 lb 9.6 oz (131.4 kg)     Weight lost 19 lbs over 4 months  Vitals: /82 (Site: Left Upper Arm, Position: Sitting, Cuff Size: Medium Adult)   Pulse 65   Temp 97.3 °F (36.3 °C) (Temporal)   Ht 5' 8\" (1.727 m)   Wt 266 lb 3.2 oz (120.7 kg)   SpO2 97% Comment: on RA  BMI 40.48 kg/m²       Physical Exam  Vitals and nursing note reviewed. Constitutional:       Appearance: She is morbidly obese. HENT:      Head: Normocephalic and atraumatic. Eyes:      Conjunctiva/sclera: Conjunctivae normal.      Pupils: Pupils are equal, round, and reactive to light. Neck:      Vascular: No JVD. Cardiovascular:      Rate and Rhythm: Normal rate and regular rhythm. Heart sounds: Normal heart sounds. No murmur heard. No friction rub. No gallop. Pulmonary:      Effort: Pulmonary effort is normal. No respiratory distress. Breath sounds: Normal breath sounds. No wheezing or rales. Abdominal:      General: Bowel sounds are normal.      Palpations: Abdomen is soft. Musculoskeletal:         General: Normal range of motion. Cervical back: Normal range of motion and neck supple. Skin:     General: Skin is warm and dry. Capillary Refill: Capillary refill takes less than 2 seconds. Neurological:      Mental Status: She is alert and oriented to person, place, and time. Psychiatric:         Behavior: Behavior normal.         Thought Content:  Thought content normal.         Judgment: Judgment normal.

## 2022-09-06 ENCOUNTER — PATIENT MESSAGE (OUTPATIENT)
Dept: FAMILY MEDICINE CLINIC | Age: 32
End: 2022-09-06

## 2022-11-11 ENCOUNTER — HOSPITAL ENCOUNTER (OUTPATIENT)
Age: 32
Setting detail: SPECIMEN
Discharge: HOME | End: 2022-11-11

## 2022-11-11 LAB
ALBUMIN SERPL-MCNC: 4.1 G/DL (ref 3.5–5.2)
ALBUMIN/GLOBULIN RATIO: 1.5 (ref 1–2.5)
ALP BLD-CCNC: 76 U/L (ref 35–104)
ALT SERPL-CCNC: 23 U/L (ref 5–33)
ANION GAP SERPL CALCULATED.3IONS-SCNC: 12 MMOL/L (ref 9–17)
AST SERPL-CCNC: 18 U/L
BILIRUB SERPL-MCNC: 0.2 MG/DL (ref 0.3–1.2)
BUN BLDV-MCNC: 7 MG/DL (ref 6–20)
CALCIUM SERPL-MCNC: 8.9 MG/DL (ref 8.6–10.4)
CHLORIDE BLD-SCNC: 102 MMOL/L (ref 98–107)
CHOLESTEROL/HDL RATIO: 5.4
CHOLESTEROL: 193 MG/DL
CO2: 26 MMOL/L (ref 20–31)
CREAT SERPL-MCNC: 0.44 MG/DL (ref 0.5–0.9)
GFR SERPL CREATININE-BSD FRML MDRD: >60 ML/MIN/1.73M2
GLUCOSE BLD-MCNC: 97 MG/DL (ref 70–99)
HCT VFR BLD CALC: 38.5 % (ref 36.3–47.1)
HDLC SERPL-MCNC: 36 MG/DL
HEMOGLOBIN: 12.2 G/DL (ref 11.9–15.1)
LDL CHOLESTEROL: 119 MG/DL (ref 0–130)
MCH RBC QN AUTO: 25.8 PG (ref 25.2–33.5)
MCHC RBC AUTO-ENTMCNC: 31.7 G/DL (ref 28.4–34.8)
MCV RBC AUTO: 81.6 FL (ref 82.6–102.9)
NRBC AUTOMATED: 0 PER 100 WBC
PDW BLD-RTO: 13.5 % (ref 11.8–14.4)
PLATELET # BLD: 320 K/UL (ref 138–453)
PMV BLD AUTO: 11.1 FL (ref 8.1–13.5)
POTASSIUM SERPL-SCNC: 3.9 MMOL/L (ref 3.7–5.3)
RBC # BLD: 4.72 M/UL (ref 3.95–5.11)
SODIUM BLD-SCNC: 140 MMOL/L (ref 135–144)
TOTAL PROTEIN: 6.9 G/DL (ref 6.4–8.3)
TRIGL SERPL-MCNC: 189 MG/DL
TSH SERPL DL<=0.05 MIU/L-ACNC: 1.04 UIU/ML (ref 0.3–5)
WBC # BLD: 4.7 K/UL (ref 3.5–11.3)

## 2025-06-30 ENCOUNTER — LAB (OUTPATIENT)
Dept: LAB | Age: 35
End: 2025-06-30

## 2025-07-11 LAB — CYTOLOGY THIN PREP PAP: NORMAL

## 2025-08-21 ENCOUNTER — LAB (OUTPATIENT)
Dept: LAB | Age: 35
End: 2025-08-21

## (undated) DEVICE — REDUCER: Brand: ENDOWRIST

## (undated) DEVICE — COLUMN DRAPE

## (undated) DEVICE — PUMP SUC IRR TBNG L10FT W/ HNDPC ASSEMB STRYKEFLOW 2

## (undated) DEVICE — SEAL

## (undated) DEVICE — GLOVE ORTHO 7 1/2   MSG9475

## (undated) DEVICE — SOLUTION ANTIFOG VIS SYS CLEARIFY LAPSCP

## (undated) DEVICE — GENERAL LAPAROSCOPY PACK-LF: Brand: MEDLINE INDUSTRIES, INC.

## (undated) DEVICE — BASIC SINGLE BASIN BTC-LF: Brand: MEDLINE INDUSTRIES, INC.

## (undated) DEVICE — BAG SPEC REM 224ML W4XL6IN DIA10MM 1 HND GYN DISP ENDOPCH

## (undated) DEVICE — TROCAR: Brand: KII FIOS FIRST ENTRY

## (undated) DEVICE — INSUFFLATION NEEDLE TO ESTABLISH PNEUMOPERITONEUM.: Brand: INSUFFLATION NEEDLE

## (undated) DEVICE — ELECTRO LUBE IS A SINGLE PATIENT USE DEVICE THAT IS INTENDED TO BE USED ON ELECTROSURGICAL ELECTRODES TO REDUCE STICKING.: Brand: KEY SURGICAL ELECTRO LUBE

## (undated) DEVICE — TUBING INSUFFLATOR HEAT HUMIDIFIED SMK EVAC SET PNEUMOCLEAR

## (undated) DEVICE — CLIP INT L POLYMER LOK LIG HEM O LOK

## (undated) DEVICE — CANNULA SEAL

## (undated) DEVICE — GLOVE ORANGE PI 8   MSG9080

## (undated) DEVICE — BLADELESS OBTURATOR: Brand: WECK VISTA

## (undated) DEVICE — GOWN,SIRUS,NON REINFRCD,LARGE,SET IN SL: Brand: MEDLINE

## (undated) DEVICE — SUTURE VCRL + SZ 0 L18IN ABSRB TIE VCP106G

## (undated) DEVICE — ARM DRAPE